# Patient Record
Sex: FEMALE | Race: WHITE | NOT HISPANIC OR LATINO | Employment: FULL TIME | URBAN - METROPOLITAN AREA
[De-identification: names, ages, dates, MRNs, and addresses within clinical notes are randomized per-mention and may not be internally consistent; named-entity substitution may affect disease eponyms.]

---

## 2017-01-19 ENCOUNTER — PRENATAL OFFICE VISIT (OUTPATIENT)
Dept: MIDWIFE SERVICES | Facility: CLINIC | Age: 32
End: 2017-01-19
Attending: ADVANCED PRACTICE MIDWIFE
Payer: COMMERCIAL

## 2017-01-19 ENCOUNTER — RADIANT APPOINTMENT (OUTPATIENT)
Dept: ULTRASOUND IMAGING | Facility: CLINIC | Age: 32
End: 2017-01-19
Attending: ADVANCED PRACTICE MIDWIFE
Payer: COMMERCIAL

## 2017-01-19 VITALS
WEIGHT: 149 LBS | TEMPERATURE: 97.9 F | BODY MASS INDEX: 24.79 KG/M2 | SYSTOLIC BLOOD PRESSURE: 117 MMHG | DIASTOLIC BLOOD PRESSURE: 70 MMHG | HEART RATE: 76 BPM

## 2017-01-19 DIAGNOSIS — Z34.82 ENCOUNTER FOR SUPERVISION OF OTHER NORMAL PREGNANCY IN SECOND TRIMESTER: ICD-10-CM

## 2017-01-19 DIAGNOSIS — Z23 NEED FOR TDAP VACCINATION: Primary | ICD-10-CM

## 2017-01-19 DIAGNOSIS — Z34.80 SUPERVISION OF OTHER NORMAL PREGNANCY, ANTEPARTUM: ICD-10-CM

## 2017-01-19 PROCEDURE — 76805 OB US >/= 14 WKS SNGL FETUS: CPT | Performed by: OBSTETRICS & GYNECOLOGY

## 2017-01-19 PROCEDURE — 99207 ZZC PRENATAL VISIT: CPT | Performed by: ADVANCED PRACTICE MIDWIFE

## 2017-01-19 NOTE — PROGRESS NOTES
US today for survey.  WNL preliminary with S=D.  Girl baby.  Have son at home.  No other concerns.  Will RTC in 5 wks for GCT.   JE

## 2017-01-23 ENCOUNTER — MYC MEDICAL ADVICE (OUTPATIENT)
Dept: MIDWIFE SERVICES | Facility: CLINIC | Age: 32
End: 2017-01-23

## 2017-02-24 ENCOUNTER — PRENATAL OFFICE VISIT (OUTPATIENT)
Dept: MIDWIFE SERVICES | Facility: CLINIC | Age: 32
End: 2017-02-24
Payer: COMMERCIAL

## 2017-02-24 VITALS
BODY MASS INDEX: 26.13 KG/M2 | WEIGHT: 157 LBS | DIASTOLIC BLOOD PRESSURE: 65 MMHG | HEART RATE: 75 BPM | SYSTOLIC BLOOD PRESSURE: 114 MMHG | OXYGEN SATURATION: 99 %

## 2017-02-24 DIAGNOSIS — Z34.82 OTHER NORMAL PREGNANCY, NOT FIRST, SECOND TRIMESTER: ICD-10-CM

## 2017-02-24 DIAGNOSIS — Z23 NEED FOR TDAP VACCINATION: Primary | ICD-10-CM

## 2017-02-24 LAB
GLUCOSE 1H P 50 G GLC PO SERPL-MCNC: 91 MG/DL (ref 60–129)
HGB BLD-MCNC: 12.2 G/DL (ref 11.7–15.7)

## 2017-02-24 PROCEDURE — 82950 GLUCOSE TEST: CPT | Performed by: ADVANCED PRACTICE MIDWIFE

## 2017-02-24 PROCEDURE — 00000218 ZZHCL STATISTIC OBHBG - HEMOGLOBIN: Performed by: ADVANCED PRACTICE MIDWIFE

## 2017-02-24 PROCEDURE — 99207 ZZC PRENATAL VISIT: CPT | Performed by: ADVANCED PRACTICE MIDWIFE

## 2017-02-24 PROCEDURE — 36415 COLL VENOUS BLD VENIPUNCTURE: CPT | Performed by: ADVANCED PRACTICE MIDWIFE

## 2017-02-24 NOTE — PROGRESS NOTES
24w1d  Feeling well. gct and hgb today. Having some discomforts sleeping and feels tired often. Reports good fetal movement. Denies leaking of fluid, vaginal bleeding, regular uterine contractions, headache or other concerns.  Discussed upcoming Tdap. RTC in 4 wks RONNIE

## 2017-02-24 NOTE — MR AVS SNAPSHOT
After Visit Summary   2/24/2017    Candy Rodriguez    MRN: [id number]           Patient Information     Date Of Birth          1985        Visit Information        Provider Department      2/24/2017 9:00 AM Alysha Medina APRN CNM [facility]        Today's Diagnoses     Need for Tdap vaccination    -  1    Other normal pregnancy, not first, second trimester           Follow-ups after your visit        Who to contact     If you have questions or need follow up information about today's clinic visit or your schedule please contact [facility] directly at [phone].  Normal or non-critical lab and imaging results will be communicated to you by Nomioshart, letter or phone within 4 business days after the clinic has received the results. If you do not hear from us within 7 days, please contact the clinic through iPinYout or phone. If you have a critical or abnormal lab result, we will notify you by phone as soon as possible.  Submit refill requests through Perfectore or call your pharmacy and they will forward the refill request to us. Please allow 3 business days for your refill to be completed.          Additional Information About Your Visit        MyChart Information     Perfectore gives you secure access to your electronic health record. If you see a primary care provider, you can also send messages to your care team and make appointments. If you have questions, please call your primary care clinic.  If you do not have a primary care provider, please call [phone] and they will assist you.        Care EveryWhere ID     This is your Care EveryWhere ID. This could be used by other organizations to access your [place] medical records  [id number]        Your Vitals Were     Pulse Last Period Pulse Oximetry Breastfeeding? BMI (Body Mass Index)       75 09/08/2016 (Exact Date) 99% No 26.13 kg/m2        Blood Pressure from Last 3 Encounters:   02/24/17 114/65    01/19/17 117/70   12/26/16 108/66    Weight from Last 3 Encounters:   02/24/17 157 lb (71.2 kg)   01/19/17 149 lb (67.6 kg)   12/26/16 147 lb 1.6 oz (66.7 kg)              We Performed the Following     Glucose tolerance gest screen 1 hour     OB hemoglobin        Primary Care Provider    Physician No Ref-Primary       No address on file        Thank you!     Thank you for choosing [facility]  for your care. Our goal is always to provide you with excellent care. Hearing back from our patients is one way we can continue to improve our services. Please take a few minutes to complete the written survey that you may receive in the mail after your visit with us. Thank you!             Your Updated Medication List - Protect others around you: Learn how to safely use, store and throw away your medicines at www.disposemymeds.org.          This list is accurate as of: 2/24/17  9:31 AM.  Always use your most recent med list.                   Brand Name Dispense Instructions for use    PRENATAL VITAMIN PO      Take 1 tablet by mouth daily

## 2017-02-25 ASSESSMENT — PATIENT HEALTH QUESTIONNAIRE - PHQ9: SUM OF ALL RESPONSES TO PHQ QUESTIONS 1-9: 0

## 2017-02-27 NOTE — PROGRESS NOTES
Dear Candy,    Your test results are attached below. Great news, you passed your glucose test and your hemoglobin is at a good level.  If you have any questions, please contact me via Preisbockt or you can call our office at [phone].    Alysha Jain CNM, ANA MARÍAM

## 2017-03-29 ENCOUNTER — PRENATAL OFFICE VISIT (OUTPATIENT)
Dept: MIDWIFE SERVICES | Facility: CLINIC | Age: 32
End: 2017-03-29
Payer: COMMERCIAL

## 2017-03-29 VITALS
OXYGEN SATURATION: 96 % | DIASTOLIC BLOOD PRESSURE: 66 MMHG | WEIGHT: 162 LBS | BODY MASS INDEX: 26.96 KG/M2 | TEMPERATURE: 97.2 F | SYSTOLIC BLOOD PRESSURE: 111 MMHG | HEART RATE: 88 BPM

## 2017-03-29 DIAGNOSIS — Z34.80 SUPERVISION OF OTHER NORMAL PREGNANCY, ANTEPARTUM: ICD-10-CM

## 2017-03-29 DIAGNOSIS — Z23 NEED FOR TDAP VACCINATION: Primary | ICD-10-CM

## 2017-03-29 PROCEDURE — 99207 ZZC PRENATAL VISIT: CPT | Performed by: ADVANCED PRACTICE MIDWIFE

## 2017-03-29 PROCEDURE — 90471 IMMUNIZATION ADMIN: CPT | Performed by: ADVANCED PRACTICE MIDWIFE

## 2017-03-29 PROCEDURE — 90715 TDAP VACCINE 7 YRS/> IM: CPT | Performed by: ADVANCED PRACTICE MIDWIFE

## 2017-03-29 NOTE — PROGRESS NOTES
Doing well.  No concerns.  May take Infant CPR refresher but not CBE.  Discussed difference with 1st vs 2nd pregnancy with BH ctx and faster labor on average.   ASSESSMENT: 28w6d  PLAN: RTC in 4 weeks.  Tdap done today.    JE

## 2017-03-29 NOTE — MR AVS SNAPSHOT
After Visit Summary   3/29/2017    Candy Rodriguez    MRN: [id number]           Patient Information     Date Of Birth          1985        Visit Information        Provider Department      3/29/2017 8:00 AM Wyatt Lindsay APRN CNM [facility]        Today's Diagnoses     Need for Tdap vaccination    -  1    Supervision of other normal pregnancy, antepartum           Follow-ups after your visit        Follow-up notes from your care team     Return in about 4 weeks (around 4/26/2017).      Your next 10 appointments already scheduled     Apr 26, 2017  8:00 AM JENNIFERT   ESTABLISHED PRENATAL with Dawna Doyle CNM   [facility] ([facility])    [address] [place] [address] [phone]              Who to contact     If you have questions or need follow up information about today's clinic visit or your schedule please contact [facility] directly at [phone].  Normal or non-critical lab and imaging results will be communicated to you by Africa Interactivehart, letter or phone within 4 business days after the clinic has received the results. If you do not hear from us within 7 days, please contact the clinic through [employer]t or phone. If you have a critical or abnormal lab result, we will notify you by phone as soon as possible.  Submit refill requests through Bravofly or call your pharmacy and they will forward the refill request to us. Please allow 3 business days for your refill to be completed.          Additional Information About Your Visit        MyChart Information     Bravofly gives you secure access to your electronic health record. If you see a primary care provider, you can also send messages to your care team and make appointments. If you have questions, please call your primary care clinic.  If you do not have a primary care provider, please call [phone] and they will assist you.        Care EveryWhere ID      This is your Care EveryWhere ID. This could be used by other organizations to access your [place] medical records  [id number]        Your Vitals Were     Pulse Temperature Last Period Pulse Oximetry BMI (Body Mass Index)       88 97.2  F (36.2  C) (Oral) 09/08/2016 (Exact Date) 96% 26.96 kg/m2        Blood Pressure from Last 3 Encounters:   03/29/17 111/66   02/24/17 114/65   01/19/17 117/70    Weight from Last 3 Encounters:   03/29/17 162 lb (73.5 kg)   02/24/17 157 lb (71.2 kg)   01/19/17 149 lb (67.6 kg)              We Performed the Following     ADMIN 1st VACCINE     TDAP VACCINE (ADACEL)        Primary Care Provider    Physician No Ref-Primary       No address on file        Thank you!     Thank you for choosing [facility]  for your care. Our goal is always to provide you with excellent care. Hearing back from our patients is one way we can continue to improve our services. Please take a few minutes to complete the written survey that you may receive in the mail after your visit with us. Thank you!             Your Updated Medication List - Protect others around you: Learn how to safely use, store and throw away your medicines at www.disposemymeds.org.          This list is accurate as of: 3/29/17  8:41 AM.  Always use your most recent med list.                   Brand Name Dispense Instructions for use    PRENATAL VITAMIN PO      Take 1 tablet by mouth daily

## 2017-05-01 ENCOUNTER — PRENATAL OFFICE VISIT (OUTPATIENT)
Dept: MIDWIFE SERVICES | Facility: CLINIC | Age: 32
End: 2017-05-01
Payer: COMMERCIAL

## 2017-05-01 VITALS
TEMPERATURE: 98.4 F | BODY MASS INDEX: 27.79 KG/M2 | WEIGHT: 167 LBS | SYSTOLIC BLOOD PRESSURE: 111 MMHG | HEART RATE: 71 BPM | DIASTOLIC BLOOD PRESSURE: 74 MMHG

## 2017-05-01 DIAGNOSIS — Z23 NEED FOR TDAP VACCINATION: ICD-10-CM

## 2017-05-01 DIAGNOSIS — Z34.80 SUPERVISION OF OTHER NORMAL PREGNANCY, ANTEPARTUM: Primary | ICD-10-CM

## 2017-05-01 PROCEDURE — 99207 ZZC PRENATAL VISIT: CPT | Performed by: ADVANCED PRACTICE MIDWIFE

## 2017-05-01 NOTE — PROGRESS NOTES
Feeling well, no concerns. May have viral pink eye from son. Baby is active. No regular contractions, LOF or bleeding. RTC in 2-3 weeks for GBS, hgb. MML

## 2017-05-01 NOTE — MR AVS SNAPSHOT
After Visit Summary   5/1/2017    Candy Rodriguez    MRN: [id number]           Patient Information     Date Of Birth          1985        Visit Information        Provider Department      5/1/2017 12:30 PM Shell Ricardo APRN CNM [facility]        Today's Diagnoses     Supervision of other normal pregnancy, antepartum    -  1    Need for Tdap vaccination           Follow-ups after your visit        Who to contact     If you have questions or need follow up information about today's clinic visit or your schedule please contact [facility] directly at [phone].  Normal or non-critical lab and imaging results will be communicated to you by Newlanshart, letter or phone within 4 business days after the clinic has received the results. If you do not hear from us within 7 days, please contact the clinic through Newlanshart or phone. If you have a critical or abnormal lab result, we will notify you by phone as soon as possible.  Submit refill requests through [employer] Of [employer] Entertainment or call your pharmacy and they will forward the refill request to us. Please allow 3 business days for your refill to be completed.          Additional Information About Your Visit        MyChart Information     [employer] Entertainment gives you secure access to your electronic health record. If you see a primary care provider, you can also send messages to your care team and make appointments. If you have questions, please call your primary care clinic.  If you do not have a primary care provider, please call [phone] and they will assist you.        Care EveryWhere ID     This is your Care EveryWhere ID. This could be used by other organizations to access your [place] medical records  [id number]        Your Vitals Were     Pulse Temperature Last Period BMI (Body Mass Index)          71 98.4  F (36.9  C) (Oral) 09/08/2016 (Exact Date) 27.79 kg/m2         Blood Pressure from Last 3 Encounters:   05/01/17 111/74    03/29/17 111/66   02/24/17 114/65    Weight from Last 3 Encounters:   05/01/17 167 lb (75.8 kg)   03/29/17 162 lb (73.5 kg)   02/24/17 157 lb (71.2 kg)              Today, you had the following     No orders found for display       Primary Care Provider Office Phone # Fax #    Shell Lopezhmann MAREK [facility] [phone]       [facility] [address] [facility] [address]  [facility] [address]        Thank you!     Thank you for choosing [facility]  for your care. Our goal is always to provide you with excellent care. Hearing back from our patients is one way we can continue to improve our services. Please take a few minutes to complete the written survey that you may receive in the mail after your visit with us. Thank you!             Your Updated Medication List - Protect others around you: Learn how to safely use, store and throw away your medicines at www.disposemymeds.org.          This list is accurate as of: 5/1/17 12:54 PM.  Always use your most recent med list.                   Brand Name Dispense Instructions for use    PRENATAL VITAMIN PO      Take 1 tablet by mouth daily

## 2017-05-22 ENCOUNTER — PRENATAL OFFICE VISIT (OUTPATIENT)
Dept: MIDWIFE SERVICES | Facility: CLINIC | Age: 32
End: 2017-05-22
Payer: COMMERCIAL

## 2017-05-22 VITALS
DIASTOLIC BLOOD PRESSURE: 77 MMHG | OXYGEN SATURATION: 100 % | WEIGHT: 168 LBS | BODY MASS INDEX: 27.96 KG/M2 | SYSTOLIC BLOOD PRESSURE: 120 MMHG | HEART RATE: 84 BPM

## 2017-05-22 DIAGNOSIS — Z34.83 ENCOUNTER FOR SUPERVISION OF OTHER NORMAL PREGNANCY IN THIRD TRIMESTER: Primary | ICD-10-CM

## 2017-05-22 DIAGNOSIS — Z23 NEED FOR TDAP VACCINATION: ICD-10-CM

## 2017-05-22 LAB — HGB BLD-MCNC: 13.7 G/DL (ref 11.7–15.7)

## 2017-05-22 PROCEDURE — 87653 STREP B DNA AMP PROBE: CPT | Performed by: ADVANCED PRACTICE MIDWIFE

## 2017-05-22 PROCEDURE — 36416 COLLJ CAPILLARY BLOOD SPEC: CPT | Performed by: ADVANCED PRACTICE MIDWIFE

## 2017-05-22 PROCEDURE — 00000218 ZZHCL STATISTIC OBHBG - HEMOGLOBIN: Performed by: ADVANCED PRACTICE MIDWIFE

## 2017-05-22 PROCEDURE — 99207 ZZC PRENATAL VISIT: CPT | Performed by: ADVANCED PRACTICE MIDWIFE

## 2017-05-22 NOTE — MR AVS SNAPSHOT
After Visit Summary   5/22/2017    Candy Rodriguez    MRN: [id number]           Patient Information     Date Of Birth          1985        Visit Information        Provider Department      5/22/2017 10:15 AM Beth Rodas APRN CNM [facility]        Today's Diagnoses     Encounter for supervision of other normal pregnancy in third trimester    -  1    Need for Tdap vaccination           Follow-ups after your visit        Your next 10 appointments already scheduled     May 30, 2017  8:15 AM CDT   ESTABLISHED PRENATAL with MAREK Pelayo CNM   [facility] ([facility])    [address] [place] [address] [phone]              Who to contact     If you have questions or need follow up information about today's clinic visit or your schedule please contact [facility] directly at [phone].  Normal or non-critical lab and imaging results will be communicated to you by MyChart, letter or phone within 4 business days after the clinic has received the results. If you do not hear from us within 7 days, please contact the clinic through Medgenicshart or phone. If you have a critical or abnormal lab result, we will notify you by phone as soon as possible.  Submit refill requests through Xenon Arc or call your pharmacy and they will forward the refill request to us. Please allow 3 business days for your refill to be completed.          Additional Information About Your Visit        MyChart Information     Xenon Arc gives you secure access to your electronic health record. If you see a primary care provider, you can also send messages to your care team and make appointments. If you have questions, please call your primary care clinic.  If you do not have a primary care provider, please call [phone] and they will assist you.        Care EveryWhere ID     This is your Care EveryWhere ID. This could be used by  other organizations to access your [place] medical records  [id number]        Your Vitals Were     Pulse Last Period Pulse Oximetry Breastfeeding? BMI (Body Mass Index)       84 09/08/2016 (Exact Date) 100% No 27.96 kg/m2        Blood Pressure from Last 3 Encounters:   05/22/17 120/77   05/01/17 111/74   03/29/17 111/66    Weight from Last 3 Encounters:   05/22/17 168 lb (76.2 kg)   05/01/17 167 lb (75.8 kg)   03/29/17 162 lb (73.5 kg)              We Performed the Following     Group B strep PCR     OB hemoglobin        Primary Care Provider Office Phone # Fax #    Shell MAREK Vick [facility] [phone]       [facility] [address] [facility] [address]  [facility] [address]        Thank you!     Thank you for choosing [facility]  for your care. Our goal is always to provide you with excellent care. Hearing back from our patients is one way we can continue to improve our services. Please take a few minutes to complete the written survey that you may receive in the mail after your visit with us. Thank you!             Your Updated Medication List - Protect others around you: Learn how to safely use, store and throw away your medicines at www.disposemymeds.org.          This list is accurate as of: 5/22/17 11:29 AM.  Always use your most recent med list.                   Brand Name Dispense Instructions for use    PRENATAL VITAMIN PO      Take 1 tablet by mouth daily

## 2017-05-22 NOTE — PROGRESS NOTES
36w4d   Patient feeling well. Positive fetal movement. Denies water leaking, vaginal bleeding, decreased fetal movement, contraction pain, or headaches.   Patient is doing well and no concerns today. She has lower abdominal pain and pressure but using some comfort measures to help. Measuring big still but measured big with her last baby and he was 7lb 15oz and had a normal growth during the pregnancy. Her sister, who is currently 34 weeks pregnant, also is measuring bigger. GBS and HGB today. Wanted cervix checked, 0/30/-4 intact.   Danger signs reviewed, pre-eclampsia signs and symptoms discussed.   Knows when to call triage and has phone numbers.   Follow up in 1 week.   Beth GOTTI

## 2017-05-23 ENCOUNTER — MYC MEDICAL ADVICE (OUTPATIENT)
Dept: MIDWIFE SERVICES | Facility: CLINIC | Age: 32
End: 2017-05-23

## 2017-05-23 LAB
GP B STREP DNA SPEC QL NAA+PROBE: ABNORMAL
SPECIMEN SOURCE: ABNORMAL

## 2017-05-23 PROCEDURE — 87186 SC STD MICRODIL/AGAR DIL: CPT | Performed by: ADVANCED PRACTICE MIDWIFE

## 2017-05-25 NOTE — TELEPHONE ENCOUNTER
From: Candy Rodriguez  To: Beth Rodas APRN CNM  Sent: 5/23/2017 4:22 PM CDT  Subject: Allergy testing    Elias Campos,     I know you mentioned that I could have allergy testing prior to delivery to determine if I am allergic to Penicillin. Is that something I should schedule now, before my next appointment? I'm seeing Wyatt on Tuesday morning. Is it usually difficult to get an appointment with the allergists?     Thanks,     Candy

## 2017-05-26 LAB
BACTERIA SPEC CULT: ABNORMAL
MICRO REPORT STATUS: ABNORMAL
MICROORGANISM SPEC CULT: ABNORMAL
SPECIMEN SOURCE: ABNORMAL

## 2017-05-27 PROBLEM — O99.820 GBS (GROUP B STREPTOCOCCUS CARRIER), +RV CULTURE, CURRENTLY PREGNANT: Status: ACTIVE | Noted: 2017-05-27

## 2017-05-30 ENCOUNTER — PRENATAL OFFICE VISIT (OUTPATIENT)
Dept: MIDWIFE SERVICES | Facility: CLINIC | Age: 32
End: 2017-05-30
Payer: COMMERCIAL

## 2017-05-30 VITALS
WEIGHT: 169 LBS | DIASTOLIC BLOOD PRESSURE: 74 MMHG | HEART RATE: 67 BPM | SYSTOLIC BLOOD PRESSURE: 124 MMHG | BODY MASS INDEX: 28.12 KG/M2 | TEMPERATURE: 97.3 F

## 2017-05-30 DIAGNOSIS — O99.820 GBS (GROUP B STREPTOCOCCUS CARRIER), +RV CULTURE, CURRENTLY PREGNANT: ICD-10-CM

## 2017-05-30 PROCEDURE — 99207 ZZC PRENATAL VISIT: CPT | Performed by: ADVANCED PRACTICE MIDWIFE

## 2017-05-30 NOTE — MR AVS SNAPSHOT
After Visit Summary   5/30/2017    Candy Rodriguez    MRN: [id number]           Patient Information     Date Of Birth          1985        Visit Information        Provider Department      5/30/2017 8:15 AM Wyatt Lindsay APRN CNM [facility]        Today's Diagnoses     GBS (group B Streptococcus carrier), +RV culture, currently pregnant           Follow-ups after your visit        Your next 10 appointments already scheduled     Jun 05, 2017  9:00 AM CDT   ESTABLISHED PRENATAL with MAREK Gonzales CNM   [facility] ([facility])    [address] [place] [address] [phone]              Who to contact     If you have questions or need follow up information about today's clinic visit or your schedule please contact [facility] directly at [phone].  Normal or non-critical lab and imaging results will be communicated to you by MyChart, letter or phone within 4 business days after the clinic has received the results. If you do not hear from us within 7 days, please contact the clinic through SpectraRephart or phone. If you have a critical or abnormal lab result, we will notify you by phone as soon as possible.  Submit refill requests through Telormedix or call your pharmacy and they will forward the refill request to us. Please allow 3 business days for your refill to be completed.          Additional Information About Your Visit        MyChart Information     Telormedix gives you secure access to your electronic health record. If you see a primary care provider, you can also send messages to your care team and make appointments. If you have questions, please call your primary care clinic.  If you do not have a primary care provider, please call [phone] and they will assist you.        Care EveryWhere ID     This is your Care EveryWhere ID. This could be used by other organizations to access your [place]  medical records  [id number]        Your Vitals Were     Pulse Temperature Last Period BMI (Body Mass Index)          67 97.3  F (36.3  C) (Oral) 09/08/2016 (Exact Date) 28.12 kg/m2         Blood Pressure from Last 3 Encounters:   05/30/17 124/74   05/22/17 120/77   05/01/17 111/74    Weight from Last 3 Encounters:   05/30/17 169 lb (76.7 kg)   05/22/17 168 lb (76.2 kg)   05/01/17 167 lb (75.8 kg)              Today, you had the following     No orders found for display       Primary Care Provider Office Phone # Fax #    Shell MAREK Vick [facility] [phone]       [facility] [address] [facility] [address]  [facility] [address]        Thank you!     Thank you for choosing [facility]  for your care. Our goal is always to provide you with excellent care. Hearing back from our patients is one way we can continue to improve our services. Please take a few minutes to complete the written survey that you may receive in the mail after your visit with us. Thank you!             Your Updated Medication List - Protect others around you: Learn how to safely use, store and throw away your medicines at www.disposemymeds.org.          This list is accurate as of: 5/30/17  2:09 PM.  Always use your most recent med list.                   Brand Name Dispense Instructions for use    PRENATAL VITAMIN PO      Take 1 tablet by mouth daily

## 2017-05-30 NOTE — PROGRESS NOTES
Candy is doing well.  Here sister is also due with her 1st a few weeks behind Candy so very fun.  No concerns.  Reviewed GBS positive result and hx as a baby that she had a rash per her mother.  No reaction to other antibiotics possibly even had Amoxicillin without issue.  Use Ancef in labor.  ASSESSMENT: 37w5d   PLAN: RTC weekly for PNV.    ERIC

## 2017-06-05 ENCOUNTER — PRENATAL OFFICE VISIT (OUTPATIENT)
Dept: MIDWIFE SERVICES | Facility: CLINIC | Age: 32
End: 2017-06-05
Payer: COMMERCIAL

## 2017-06-05 VITALS
BODY MASS INDEX: 28.46 KG/M2 | OXYGEN SATURATION: 99 % | DIASTOLIC BLOOD PRESSURE: 74 MMHG | HEART RATE: 81 BPM | SYSTOLIC BLOOD PRESSURE: 116 MMHG | WEIGHT: 171 LBS

## 2017-06-05 DIAGNOSIS — Z34.83 ENCOUNTER FOR SUPERVISION OF OTHER NORMAL PREGNANCY IN THIRD TRIMESTER: Primary | ICD-10-CM

## 2017-06-05 PROCEDURE — 99207 ZZC PRENATAL VISIT: CPT | Performed by: ADVANCED PRACTICE MIDWIFE

## 2017-06-05 NOTE — MR AVS SNAPSHOT
After Visit Summary   6/5/2017    Candy Rodriguez    MRN: [id number]           Patient Information     Date Of Birth          1985        Visit Information        Provider Department      6/5/2017 9:00 AM Beth Rodas APRN CNM [facility]        Today's Diagnoses     Encounter for supervision of other normal pregnancy in third trimester    -  1       Follow-ups after your visit        Your next 10 appointments already scheduled     Jun 12, 2017  9:45 AM CDT   ESTABLISHED PRENATAL with MAREK Garcia CNM   [facility] ([facility])    [address] [place] [address] [phone]              Who to contact     If you have questions or need follow up information about today's clinic visit or your schedule please contact [facility] directly at [phone].  Normal or non-critical lab and imaging results will be communicated to you by MyChart, letter or phone within 4 business days after the clinic has received the results. If you do not hear from us within 7 days, please contact the clinic through MyChart or phone. If you have a critical or abnormal lab result, we will notify you by phone as soon as possible.  Submit refill requests through Imitix or call your pharmacy and they will forward the refill request to us. Please allow 3 business days for your refill to be completed.          Additional Information About Your Visit        MyChart Information     Imitix gives you secure access to your electronic health record. If you see a primary care provider, you can also send messages to your care team and make appointments. If you have questions, please call your primary care clinic.  If you do not have a primary care provider, please call [phone] and they will assist you.        Care EveryWhere ID     This is your Care EveryWhere ID. This could be used by other organizations to  access your [place] medical records  [id number]        Your Vitals Were     Pulse Last Period Pulse Oximetry BMI (Body Mass Index)          81 09/08/2016 (Exact Date) 99% 28.46 kg/m2         Blood Pressure from Last 3 Encounters:   06/05/17 116/74   05/30/17 124/74   05/22/17 120/77    Weight from Last 3 Encounters:   06/05/17 171 lb (77.6 kg)   05/30/17 169 lb (76.7 kg)   05/22/17 168 lb (76.2 kg)              Today, you had the following     No orders found for display       Primary Care Provider Office Phone # Fax #    Shell MAREK Vick [facility] [phone]       [facility] [address] [facility] [address]  [facility] [address]        Thank you!     Thank you for choosing [facility]  for your care. Our goal is always to provide you with excellent care. Hearing back from our patients is one way we can continue to improve our services. Please take a few minutes to complete the written survey that you may receive in the mail after your visit with us. Thank you!             Your Updated Medication List - Protect others around you: Learn how to safely use, store and throw away your medicines at www.disposemymeds.org.          This list is accurate as of: 6/5/17  9:31 AM.  Always use your most recent med list.                   Brand Name Dispense Instructions for use    PRENATAL VITAMIN PO      Take 1 tablet by mouth daily

## 2017-06-05 NOTE — PROGRESS NOTES
38w4d  Patient feeling well. Positive fetal movement. Denies water leaking, vaginal bleeding, decreased fetal movement, contraction pain, or headaches.   Feeling well still. Ready for the baby and hoping not to go too far past her due date. No questions or concerns today. Discussed antibiotic choices for GBS, most likely use Ancef.   Danger signs reviewed, pre-eclampsia signs and symptoms discussed.   Knows when to call triage and has phone numbers.   Follow up in 1 week.   Beth GOTTI

## 2017-06-12 ENCOUNTER — PRENATAL OFFICE VISIT (OUTPATIENT)
Dept: MIDWIFE SERVICES | Facility: CLINIC | Age: 32
End: 2017-06-12
Payer: COMMERCIAL

## 2017-06-12 VITALS
BODY MASS INDEX: 28.29 KG/M2 | TEMPERATURE: 97.8 F | HEART RATE: 66 BPM | SYSTOLIC BLOOD PRESSURE: 126 MMHG | WEIGHT: 170 LBS | DIASTOLIC BLOOD PRESSURE: 69 MMHG

## 2017-06-12 DIAGNOSIS — Z34.83 OTHER NORMAL PREGNANCY, NOT FIRST, THIRD TRIMESTER: Primary | ICD-10-CM

## 2017-06-12 PROCEDURE — 99207 ZZC PRENATAL VISIT: CPT | Performed by: ADVANCED PRACTICE MIDWIFE

## 2017-06-12 NOTE — MR AVS SNAPSHOT
After Visit Summary   6/12/2017    Candy Rodriguez    MRN: [id number]           Patient Information     Date Of Birth          1985        Visit Information        Provider Department      6/12/2017 9:45 AM Alysha Medina APRN CNM [facility] [place] [place]        Today's Diagnoses     Other normal pregnancy, not first, third trimester    -  1       Follow-ups after your visit        Your next 10 appointments already scheduled     Jun 19, 2017  8:15 AM CDT   ESTABLISHED PRENATAL with MAREK Gonzales CNM   [facility] [place] [place] ([facility] [place] [place])    [address] [place] [id number] [phone]              Who to contact     If you have questions or need follow up information about today's clinic visit or your schedule please contact [facility] directly at [phone].  Normal or non-critical lab and imaging results will be communicated to you by MyChart, letter or phone within 4 business days after the clinic has received the results. If you do not hear from us within 7 days, please contact the clinic through TEVIZZhart or phone. If you have a critical or abnormal lab result, we will notify you by phone as soon as possible.  Submit refill requests through Yuanfen~Flowâ„¢ or call your pharmacy and they will forward the refill request to us. Please allow 3 business days for your refill to be completed.          Additional Information About Your Visit        MyChart Information     Yuanfen~Flowâ„¢ gives you secure access to your electronic health record. If you see a primary care provider, you can also send messages to your care team and make appointments. If you have questions, please call your primary care clinic.  If you do not have a primary care provider, please call [phone] and they will assist you.        Care EveryWhere ID     This is your Care EveryWhere ID. This could be used by other organizations to access your [facility]  medical records  [id number]        Your Vitals Were     Pulse Temperature Last Period BMI (Body Mass Index)          66 97.8  F (36.6  C) (Oral) 09/08/2016 (Exact Date) 28.29 kg/m2         Blood Pressure from Last 3 Encounters:   06/12/17 126/69   06/05/17 116/74   05/30/17 124/74    Weight from Last 3 Encounters:   06/12/17 170 lb (77.1 kg)   06/05/17 171 lb (77.6 kg)   05/30/17 169 lb (76.7 kg)              Today, you had the following     No orders found for display       Primary Care Provider Office Phone # Fax #    Shell MAREK Vick [facility] [phone]       [facility] [address] [facility] [address]  [facility] [address]        Thank you!     Thank you for choosing [facility]  for your care. Our goal is always to provide you with excellent care. Hearing back from our patients is one way we can continue to improve our services. Please take a few minutes to complete the written survey that you may receive in the mail after your visit with us. Thank you!             Your Updated Medication List - Protect others around you: Learn how to safely use, store and throw away your medicines at www.disposemymeds.org.          This list is accurate as of: 6/12/17  7:46 PM.  Always use your most recent med list.                   Brand Name Dispense Instructions for use    PRENATAL VITAMIN PO      Take 1 tablet by mouth daily

## 2017-06-13 NOTE — PROGRESS NOTES
39w4d  Feeling well. Ready for baby. Reports good fetal movement. Denies leaking of fluid, vaginal bleeding, regular uterine contractions, headache or other concerns.  RTC in 1 wk KETURAH

## 2017-06-19 ENCOUNTER — ANESTHESIA EVENT (OUTPATIENT)
Dept: OBGYN | Facility: CLINIC | Age: 32
End: 2017-06-19
Payer: COMMERCIAL

## 2017-06-19 ENCOUNTER — PRENATAL OFFICE VISIT (OUTPATIENT)
Dept: MIDWIFE SERVICES | Facility: CLINIC | Age: 32
End: 2017-06-19
Payer: COMMERCIAL

## 2017-06-19 ENCOUNTER — HOSPITAL ENCOUNTER (INPATIENT)
Facility: CLINIC | Age: 32
LOS: 2 days | Discharge: HOME HEALTH | End: 2017-06-21
Attending: ADVANCED PRACTICE MIDWIFE | Admitting: ADVANCED PRACTICE MIDWIFE
Payer: COMMERCIAL

## 2017-06-19 ENCOUNTER — NURSE TRIAGE (OUTPATIENT)
Dept: NURSING | Facility: CLINIC | Age: 32
End: 2017-06-19

## 2017-06-19 ENCOUNTER — ANESTHESIA (OUTPATIENT)
Dept: OBGYN | Facility: CLINIC | Age: 32
End: 2017-06-19
Payer: COMMERCIAL

## 2017-06-19 VITALS
TEMPERATURE: 97.2 F | HEART RATE: 74 BPM | SYSTOLIC BLOOD PRESSURE: 125 MMHG | BODY MASS INDEX: 28.29 KG/M2 | DIASTOLIC BLOOD PRESSURE: 76 MMHG | WEIGHT: 170 LBS

## 2017-06-19 DIAGNOSIS — Z34.83 ENCOUNTER FOR SUPERVISION OF OTHER NORMAL PREGNANCY IN THIRD TRIMESTER: Primary | ICD-10-CM

## 2017-06-19 LAB
ABO + RH BLD: NORMAL
ABO + RH BLD: NORMAL
BLD GP AB SCN SERPL QL: NORMAL
BLOOD BANK CMNT PATIENT-IMP: NORMAL
ERYTHROCYTE [DISTWIDTH] IN BLOOD BY AUTOMATED COUNT: 13.6 % (ref 10–15)
HCT VFR BLD AUTO: 42.6 % (ref 35–47)
HGB BLD-MCNC: 14.9 G/DL (ref 11.7–15.7)
MCH RBC QN AUTO: 31.4 PG (ref 26.5–33)
MCHC RBC AUTO-ENTMCNC: 35 G/DL (ref 31.5–36.5)
MCV RBC AUTO: 90 FL (ref 78–100)
PLATELET # BLD AUTO: 154 10E9/L (ref 150–450)
RBC # BLD AUTO: 4.74 10E12/L (ref 3.8–5.2)
SPECIMEN EXP DATE BLD: NORMAL
WBC # BLD AUTO: 18.5 10E9/L (ref 4–11)

## 2017-06-19 PROCEDURE — 99215 OFFICE O/P EST HI 40 MIN: CPT

## 2017-06-19 PROCEDURE — 86850 RBC ANTIBODY SCREEN: CPT | Performed by: ADVANCED PRACTICE MIDWIFE

## 2017-06-19 PROCEDURE — 86780 TREPONEMA PALLIDUM: CPT | Performed by: ADVANCED PRACTICE MIDWIFE

## 2017-06-19 PROCEDURE — 25000125 ZZHC RX 250: Performed by: ANESTHESIOLOGY

## 2017-06-19 PROCEDURE — S0020 INJECTION, BUPIVICAINE HYDRO: HCPCS | Performed by: ANESTHESIOLOGY

## 2017-06-19 PROCEDURE — 85027 COMPLETE CBC AUTOMATED: CPT | Performed by: ADVANCED PRACTICE MIDWIFE

## 2017-06-19 PROCEDURE — 86900 BLOOD TYPING SEROLOGIC ABO: CPT | Performed by: ADVANCED PRACTICE MIDWIFE

## 2017-06-19 PROCEDURE — 99207 ZZC PRENATAL VISIT: CPT | Performed by: ADVANCED PRACTICE MIDWIFE

## 2017-06-19 PROCEDURE — 25000128 H RX IP 250 OP 636: Performed by: ADVANCED PRACTICE MIDWIFE

## 2017-06-19 PROCEDURE — 00HU33Z INSERTION OF INFUSION DEVICE INTO SPINAL CANAL, PERCUTANEOUS APPROACH: ICD-10-PCS | Performed by: ANESTHESIOLOGY

## 2017-06-19 PROCEDURE — 25000128 H RX IP 250 OP 636

## 2017-06-19 PROCEDURE — 3E0S3CZ INTRODUCTION OF REGIONAL ANESTHETIC INTO EPIDURAL SPACE, PERCUTANEOUS APPROACH: ICD-10-PCS | Performed by: ANESTHESIOLOGY

## 2017-06-19 PROCEDURE — 12000030 ZZH R&B OB INTERMEDIATE UMMC

## 2017-06-19 PROCEDURE — 86901 BLOOD TYPING SEROLOGIC RH(D): CPT | Performed by: ADVANCED PRACTICE MIDWIFE

## 2017-06-19 PROCEDURE — 0KQM0ZZ REPAIR PERINEUM MUSCLE, OPEN APPROACH: ICD-10-PCS | Performed by: ADVANCED PRACTICE MIDWIFE

## 2017-06-19 RX ORDER — LIDOCAINE HYDROCHLORIDE AND EPINEPHRINE 15; 5 MG/ML; UG/ML
INJECTION, SOLUTION EPIDURAL PRN
Status: DISCONTINUED | OUTPATIENT
Start: 2017-06-19 | End: 2018-12-11 | Stop reason: HOSPADM

## 2017-06-19 RX ORDER — EPHEDRINE SULFATE 50 MG/ML
5 INJECTION, SOLUTION INTRAMUSCULAR; INTRAVENOUS; SUBCUTANEOUS
Status: DISCONTINUED | OUTPATIENT
Start: 2017-06-19 | End: 2017-06-21 | Stop reason: HOSPADM

## 2017-06-19 RX ORDER — EPHEDRINE SULFATE 50 MG/ML
INJECTION, SOLUTION INTRAMUSCULAR; INTRAVENOUS; SUBCUTANEOUS
Status: DISCONTINUED
Start: 2017-06-19 | End: 2017-06-20 | Stop reason: WASHOUT

## 2017-06-19 RX ORDER — OXYCODONE AND ACETAMINOPHEN 5; 325 MG/1; MG/1
1 TABLET ORAL
Status: DISCONTINUED | OUTPATIENT
Start: 2017-06-19 | End: 2017-06-20

## 2017-06-19 RX ORDER — OXYTOCIN 10 [USP'U]/ML
INJECTION, SOLUTION INTRAMUSCULAR; INTRAVENOUS
Status: DISCONTINUED
Start: 2017-06-19 | End: 2017-06-20 | Stop reason: WASHOUT

## 2017-06-19 RX ORDER — ACETAMINOPHEN 325 MG/1
650 TABLET ORAL EVERY 4 HOURS PRN
Status: DISCONTINUED | OUTPATIENT
Start: 2017-06-19 | End: 2017-06-20

## 2017-06-19 RX ORDER — CEFAZOLIN SODIUM 1 G/3ML
1 INJECTION, POWDER, FOR SOLUTION INTRAMUSCULAR; INTRAVENOUS EVERY 8 HOURS
Status: DISCONTINUED | OUTPATIENT
Start: 2017-06-20 | End: 2017-06-20

## 2017-06-19 RX ORDER — NALBUPHINE HYDROCHLORIDE 10 MG/ML
2.5-5 INJECTION, SOLUTION INTRAMUSCULAR; INTRAVENOUS; SUBCUTANEOUS EVERY 6 HOURS PRN
Status: DISCONTINUED | OUTPATIENT
Start: 2017-06-19 | End: 2017-06-21 | Stop reason: HOSPADM

## 2017-06-19 RX ORDER — CARBOPROST TROMETHAMINE 250 UG/ML
250 INJECTION, SOLUTION INTRAMUSCULAR
Status: DISCONTINUED | OUTPATIENT
Start: 2017-06-19 | End: 2017-06-20

## 2017-06-19 RX ORDER — METHYLERGONOVINE MALEATE 0.2 MG/ML
200 INJECTION INTRAVENOUS
Status: DISCONTINUED | OUTPATIENT
Start: 2017-06-19 | End: 2017-06-20

## 2017-06-19 RX ORDER — NALOXONE HYDROCHLORIDE 0.4 MG/ML
.1-.4 INJECTION, SOLUTION INTRAMUSCULAR; INTRAVENOUS; SUBCUTANEOUS
Status: DISCONTINUED | OUTPATIENT
Start: 2017-06-19 | End: 2017-06-20

## 2017-06-19 RX ORDER — SODIUM CHLORIDE, SODIUM LACTATE, POTASSIUM CHLORIDE, CALCIUM CHLORIDE 600; 310; 30; 20 MG/100ML; MG/100ML; MG/100ML; MG/100ML
INJECTION, SOLUTION INTRAVENOUS CONTINUOUS
Status: DISCONTINUED | OUTPATIENT
Start: 2017-06-19 | End: 2017-06-20

## 2017-06-19 RX ORDER — CEFAZOLIN SODIUM 2 G/100ML
2 INJECTION, SOLUTION INTRAVENOUS ONCE
Status: COMPLETED | OUTPATIENT
Start: 2017-06-19 | End: 2017-06-19

## 2017-06-19 RX ORDER — OXYTOCIN 10 [USP'U]/ML
10 INJECTION, SOLUTION INTRAMUSCULAR; INTRAVENOUS
Status: DISCONTINUED | OUTPATIENT
Start: 2017-06-19 | End: 2017-06-20

## 2017-06-19 RX ORDER — FENTANYL CITRATE 50 UG/ML
50-100 INJECTION, SOLUTION INTRAMUSCULAR; INTRAVENOUS
Status: DISCONTINUED | OUTPATIENT
Start: 2017-06-19 | End: 2017-06-20

## 2017-06-19 RX ORDER — OXYTOCIN/0.9 % SODIUM CHLORIDE 30/500 ML
100-340 PLASTIC BAG, INJECTION (ML) INTRAVENOUS CONTINUOUS PRN
Status: COMPLETED | OUTPATIENT
Start: 2017-06-19 | End: 2017-06-20

## 2017-06-19 RX ORDER — LIDOCAINE HYDROCHLORIDE 10 MG/ML
INJECTION, SOLUTION EPIDURAL; INFILTRATION; INTRACAUDAL; PERINEURAL
Status: COMPLETED
Start: 2017-06-19 | End: 2017-06-20

## 2017-06-19 RX ORDER — ONDANSETRON 2 MG/ML
4 INJECTION INTRAMUSCULAR; INTRAVENOUS EVERY 6 HOURS PRN
Status: DISCONTINUED | OUTPATIENT
Start: 2017-06-19 | End: 2017-06-20

## 2017-06-19 RX ORDER — SODIUM CHLORIDE, SODIUM LACTATE, POTASSIUM CHLORIDE, CALCIUM CHLORIDE 600; 310; 30; 20 MG/100ML; MG/100ML; MG/100ML; MG/100ML
INJECTION, SOLUTION INTRAVENOUS
Status: COMPLETED
Start: 2017-06-19 | End: 2017-06-19

## 2017-06-19 RX ORDER — OXYTOCIN/0.9 % SODIUM CHLORIDE 30/500 ML
PLASTIC BAG, INJECTION (ML) INTRAVENOUS
Status: COMPLETED
Start: 2017-06-19 | End: 2017-06-20

## 2017-06-19 RX ORDER — IBUPROFEN 800 MG/1
800 TABLET, FILM COATED ORAL
Status: COMPLETED | OUTPATIENT
Start: 2017-06-19 | End: 2017-06-20

## 2017-06-19 RX ADMIN — Medication 8 ML: at 21:20

## 2017-06-19 RX ADMIN — SODIUM CHLORIDE, POTASSIUM CHLORIDE, SODIUM LACTATE AND CALCIUM CHLORIDE 1000 ML: 600; 310; 30; 20 INJECTION, SOLUTION INTRAVENOUS at 20:55

## 2017-06-19 RX ADMIN — LIDOCAINE HYDROCHLORIDE,EPINEPHRINE BITARTRATE 3 ML: 15; .005 INJECTION, SOLUTION EPIDURAL; INFILTRATION; INTRACAUDAL; PERINEURAL at 21:15

## 2017-06-19 RX ADMIN — SODIUM CHLORIDE, POTASSIUM CHLORIDE, SODIUM LACTATE AND CALCIUM CHLORIDE: 600; 310; 30; 20 INJECTION, SOLUTION INTRAVENOUS at 22:18

## 2017-06-19 RX ADMIN — CEFAZOLIN SODIUM 2 G: 2 INJECTION, SOLUTION INTRAVENOUS at 21:08

## 2017-06-19 NOTE — IP AVS SNAPSHOT
MRN:[id number]                      After Visit Summary   6/19/2017    Candy Rodriguez    MRN: [id number]           Thank you!     Thank you for choosing [facility] for your care. Our goal is always to provide you with excellent care. Hearing back from our patients is one way we can continue to improve our services. Please take a few minutes to complete the written survey that you may receive in the mail after you visit with us. Thank you!        Patient Information     Date Of Birth          1985        Designated Caregiver       Most Recent Value    Caregiver    Will someone help with your care after discharge? yes    Name of designated caregiver Zacarias    Phone number of caregiver [phone]    Caregiver address same address as pt      About your hospital stay     You were admitted on:  June 19, 2017 You last received care in the:  [facility]    You were discharged on:  June 21, 2017       Who to Call     For medical emergencies, please call 911.  For non-urgent questions about your medical care, please call your primary care provider or clinic, [phone]          Attending Provider     Provider Specialty    Wyatt Lindsay APRN CNM [facility]       Primary Care Provider Office Phone # Fax #    MAREK Barros [phone]      Further instructions from your care team       Postpartum Vaginal Delivery Instructions    Activity       Ask family and friends for help when you need it.    Do not place anything in your vagina for 6 weeks.    You are not restricted on other activities, but take it easy for a few weeks to allow your body to recover from delivery.  You are able to do any activities you feel up to that point.    No driving until you have stopped taking your pain medications (usually two weeks after delivery).     Call your health care provider if you have any of these symptoms:       Increased pain, swelling, redness, or fluid around your stiches from an episiotomy or  "perineal tear.    A fever above 100.4 F (38 C) with or without chills when placing a thermometer under your tongue.    You soak a sanitary pad with blood within 1 hour, or you see blood clots larger than a golf ball.    Bleeding that lasts more than 6 weeks.    Vaginal discharge that smells bad.    Severe pain, cramping or tenderness in your lower belly area.    A need to urinate more frequently (use the toilet more often), more urgently (use the toilet very quickly), or it burns when you urinate.    Nausea and vomiting.    Redness, swelling or pain around a vein in your leg.    Problems breastfeeding or a red or painful area on your breast.    Chest pain and cough or are gasping for air.    Problems coping with sadness, anxiety, or depression.  If you have any concerns about hurting yourself or the baby, call your provider immediately.     You have questions or concerns after you return home.     Keep your hands clean:  Always wash your hands before touching your perineal area and stitches.  This helps reduce your risk of infection.  If your hands aren't dirty, you may use an alcohol hand-rub to clean your hands. Keep your nails clean and short.        Pending Results     No orders found from 6/17/2017 to 6/20/2017.            Admission Information     Date & Time Provider Department Dept. Phone    6/19/2017 Wyatt Lindsay APRN CNM [facility] [phone]      Your Vitals Were     Blood Pressure Pulse Temperature Respirations Height Weight    121/75 66 98.4  F (36.9  C) (Oral) 18 1.651 m (5' 5\") 77.1 kg (170 lb)    Last Period Pulse Oximetry BMI (Body Mass Index)             09/08/2016 (Exact Date) 100% 28.29 kg/m2         MyChart Information     PharmAbcine gives you secure access to your electronic health record. If you see a primary care provider, you can also send messages to your care team and make appointments. If you have questions, please call your primary care clinic.  If you do not have a primary care provider, " please call [phone] and they will assist you.        Care EveryWhere ID     This is your Care EveryWhere ID. This could be used by other organizations to access your [facility] medical records  [id number]        Equal Access to Services     HUY PINEDA: Jamel Addison, waaxda luqadaha, qaybta kaalmada adetangelada, lindsey aby hayjosias saenz keltongrace pineda. So [facility] [phone].    ATENCIÓN: Si habla español, tiene a mckee disposición servicios gratuitos de asistencia lingüística. Llame al [phone].    We comply with applicable federal civil rights laws and [place] laws. We do not discriminate on the basis of race, color, national origin, age, disability sex, sexual orientation or gender identity.               Review of your medicines      UNREVIEWED medicines. Ask your doctor about these medicines        Dose / Directions    PRENATAL VITAMIN PO        Dose:  1 tablet   Take 1 tablet by mouth daily   Refills:  0                Protect others around you: Learn how to safely use, store and throw away your medicines at www.disposemymeds.org.             Medication List: This is a list of all your medications and when to take them. Check marks below indicate your daily home schedule. Keep this list as a reference.      Medications           Morning Afternoon Evening Bedtime As Needed    PRENATAL VITAMIN PO   Take 1 tablet by mouth daily

## 2017-06-19 NOTE — IP AVS SNAPSHOT
[facility]    [address] [facility] [address]    Phone:  [phone]                                       After Visit Summary   6/19/2017    Candy Rodriguez    MRN: [id number]           After Visit Summary Signature Page     I have received my discharge instructions, and my questions have been answered. I have discussed any challenges I see with this plan with the nurse or doctor.    ..........................................................................................................................................  Patient/Patient Representative Signature      ..........................................................................................................................................  Patient Representative Print Name and Relationship to Patient    ..................................................               ................................................  Date                                            Time    ..........................................................................................................................................  Reviewed by Signature/Title    ...................................................              ..............................................  Date                                                            Time

## 2017-06-19 NOTE — MR AVS SNAPSHOT
After Visit Summary   6/19/2017    Candy Rodriguez    MRN: [id number]           Patient Information     Date Of Birth          1985        Visit Information        Provider Department      6/19/2017 8:15 AM Beth Rodas APRN CNM [facility]        Today's Diagnoses     Encounter for supervision of other normal pregnancy in third trimester    -  1       Follow-ups after your visit        Future tests that were ordered for you today     Open Future Orders        Priority Expected Expires Ordered    US OB Ltd One Or More Fetus FU/Repeat Routine  6/19/2018 6/19/2017            Who to contact     If you have questions or need follow up information about today's clinic visit or your schedule please contact [facility] directly at [phone].  Normal or non-critical lab and imaging results will be communicated to you by SkuServehart, letter or phone within 4 business days after the clinic has received the results. If you do not hear from us within 7 days, please contact the clinic through SkuServehart or phone. If you have a critical or abnormal lab result, we will notify you by phone as soon as possible.  Submit refill requests through Mitralign or call your pharmacy and they will forward the refill request to us. Please allow 3 business days for your refill to be completed.          Additional Information About Your Visit        MyChart Information     Mitralign gives you secure access to your electronic health record. If you see a primary care provider, you can also send messages to your care team and make appointments. If you have questions, please call your primary care clinic.  If you do not have a primary care provider, please call [phone] and they will assist you.        Care EveryWhere ID     This is your Care EveryWhere ID. This could be used by other organizations to access your [facility] medical records  [id number]        Your Vitals Were     Pulse Temperature  Last Period BMI (Body Mass Index)          74 97.2  F (36.2  C) (Oral) 09/08/2016 (Exact Date) 28.29 kg/m2         Blood Pressure from Last 3 Encounters:   06/19/17 125/76   06/12/17 126/69   06/05/17 116/74    Weight from Last 3 Encounters:   06/19/17 170 lb (77.1 kg)   06/12/17 170 lb (77.1 kg)   06/05/17 171 lb (77.6 kg)               Primary Care Provider Office Phone # Fax #    Shell Bethea MAREK Ricardo [facility] [phone]       [facility] [address] [facility] [address]  [facility] [address]        Thank you!     Thank you for choosing [facility]  [place] your [place]. Our goal is always to provide you with excellent care. Hearing back from our patients is one way we can continue to improve our services. Please take a few minutes to complete the written survey that you may receive in the mail after your visit with us. Thank you!             Your Updated Medication List - Protect others around you: Learn how to safely use, store and throw away your medicines at www.disposemymeds.org.          This list is accurate as of: 6/19/17  8:53 AM.  Always use your most recent med list.                   Brand Name Dispense Instructions for use    PRENATAL VITAMIN PO      Take 1 tablet by mouth daily

## 2017-06-19 NOTE — PROGRESS NOTES
40w4d   Patient feeling well. Positive fetal movement. Denies water leaking, vaginal bleeding, decreased fetal movement, contraction pain, or headaches.   Patient is doing well. She is ready for labor and hoping soon. She delivered her son early so was not really expecting to go late this time. Would like to have her cervix checked today, 2/50/-2 intact. Discussed postdates testing. PALMA ordered for Thursday.   Danger signs reviewed, pre-eclampsia signs and symptoms discussed.   Knows when to call triage and has phone numbers.   Follow up in 3 days for NST and PALMA for postdates testing.   Beth GOTTI

## 2017-06-20 LAB — T PALLIDUM IGG+IGM SER QL: NEGATIVE

## 2017-06-20 PROCEDURE — 72200001 ZZH LABOR CARE VAGINAL DELIVERY SINGLE

## 2017-06-20 PROCEDURE — 12000030 ZZH R&B OB INTERMEDIATE UMMC

## 2017-06-20 PROCEDURE — 25000132 ZZH RX MED GY IP 250 OP 250 PS 637: Performed by: ADVANCED PRACTICE MIDWIFE

## 2017-06-20 PROCEDURE — 25000125 ZZHC RX 250

## 2017-06-20 PROCEDURE — 59400 OBSTETRICAL CARE: CPT | Performed by: ADVANCED PRACTICE MIDWIFE

## 2017-06-20 RX ORDER — HYDROCORTISONE 2.5 %
CREAM (GRAM) TOPICAL 3 TIMES DAILY PRN
Status: DISCONTINUED | OUTPATIENT
Start: 2017-06-20 | End: 2017-06-21 | Stop reason: HOSPADM

## 2017-06-20 RX ORDER — IBUPROFEN 400 MG/1
400-800 TABLET, FILM COATED ORAL EVERY 6 HOURS PRN
Status: DISCONTINUED | OUTPATIENT
Start: 2017-06-20 | End: 2017-06-21 | Stop reason: HOSPADM

## 2017-06-20 RX ORDER — BISACODYL 10 MG
10 SUPPOSITORY, RECTAL RECTAL DAILY PRN
Status: DISCONTINUED | OUTPATIENT
Start: 2017-06-22 | End: 2017-06-21 | Stop reason: HOSPADM

## 2017-06-20 RX ORDER — OXYTOCIN/0.9 % SODIUM CHLORIDE 30/500 ML
100 PLASTIC BAG, INJECTION (ML) INTRAVENOUS CONTINUOUS
Status: DISCONTINUED | OUTPATIENT
Start: 2017-06-20 | End: 2017-06-21 | Stop reason: HOSPADM

## 2017-06-20 RX ORDER — NALOXONE HYDROCHLORIDE 0.4 MG/ML
.1-.4 INJECTION, SOLUTION INTRAMUSCULAR; INTRAVENOUS; SUBCUTANEOUS
Status: DISCONTINUED | OUTPATIENT
Start: 2017-06-20 | End: 2017-06-21 | Stop reason: HOSPADM

## 2017-06-20 RX ORDER — MISOPROSTOL 200 UG/1
400 TABLET ORAL
Status: DISCONTINUED | OUTPATIENT
Start: 2017-06-20 | End: 2017-06-21 | Stop reason: HOSPADM

## 2017-06-20 RX ORDER — OXYTOCIN 10 [USP'U]/ML
10 INJECTION, SOLUTION INTRAMUSCULAR; INTRAVENOUS
Status: DISCONTINUED | OUTPATIENT
Start: 2017-06-20 | End: 2017-06-21 | Stop reason: HOSPADM

## 2017-06-20 RX ORDER — AMOXICILLIN 250 MG
1-2 CAPSULE ORAL 2 TIMES DAILY
Status: DISCONTINUED | OUTPATIENT
Start: 2017-06-20 | End: 2017-06-21 | Stop reason: HOSPADM

## 2017-06-20 RX ORDER — OXYTOCIN/0.9 % SODIUM CHLORIDE 30/500 ML
340 PLASTIC BAG, INJECTION (ML) INTRAVENOUS CONTINUOUS PRN
Status: DISCONTINUED | OUTPATIENT
Start: 2017-06-20 | End: 2017-06-21 | Stop reason: HOSPADM

## 2017-06-20 RX ORDER — ACETAMINOPHEN 325 MG/1
650 TABLET ORAL EVERY 4 HOURS PRN
Status: DISCONTINUED | OUTPATIENT
Start: 2017-06-20 | End: 2017-06-21 | Stop reason: HOSPADM

## 2017-06-20 RX ORDER — OXYCODONE HYDROCHLORIDE 5 MG/1
5-10 TABLET ORAL
Status: DISCONTINUED | OUTPATIENT
Start: 2017-06-20 | End: 2017-06-21 | Stop reason: HOSPADM

## 2017-06-20 RX ORDER — LANOLIN 100 %
OINTMENT (GRAM) TOPICAL
Status: DISCONTINUED | OUTPATIENT
Start: 2017-06-20 | End: 2017-06-21 | Stop reason: HOSPADM

## 2017-06-20 RX ADMIN — OXYTOCIN-SODIUM CHLORIDE 0.9% IV SOLN 30 UNIT/500ML 340 ML/HR: 30-0.9/5 SOLUTION at 01:57

## 2017-06-20 RX ADMIN — IBUPROFEN 800 MG: 400 TABLET ORAL at 22:32

## 2017-06-20 RX ADMIN — SENNOSIDES AND DOCUSATE SODIUM 1 TABLET: 8.6; 5 TABLET ORAL at 19:01

## 2017-06-20 RX ADMIN — Medication 340 ML/HR: at 01:57

## 2017-06-20 RX ADMIN — IBUPROFEN 800 MG: 800 TABLET ORAL at 02:46

## 2017-06-20 RX ADMIN — LIDOCAINE HYDROCHLORIDE 10 ML: 10 INJECTION, SOLUTION EPIDURAL; INFILTRATION; INTRACAUDAL; PERINEURAL at 02:05

## 2017-06-20 RX ADMIN — IBUPROFEN 800 MG: 400 TABLET ORAL at 15:58

## 2017-06-20 RX ADMIN — IBUPROFEN 800 MG: 400 TABLET ORAL at 08:38

## 2017-06-20 RX ADMIN — SENNOSIDES AND DOCUSATE SODIUM 1 TABLET: 8.6; 5 TABLET ORAL at 08:38

## 2017-06-20 NOTE — PROGRESS NOTES
"Post Partum Note    Candy Rodriguez      MRN#: [id number]  Age: 31 year old      YOB: 1985      Post-partum day #0    SIGNIFICANT PROBLEMS:  Patient Active Problem List    Diagnosis Date Noted      (normal spontaneous vaginal delivery) 2017     Priority: Medium     Female at 0150 on  with 2nd degree laceration repair.  .  Apgar 9/9.  GBS adequately treated in labor.         Labor and delivery indication for care or intervention 2017     Priority: Medium     GBS (group B Streptococcus carrier), +RV culture, currently pregnant 2017     Priority: Medium     Allergic to PCN with rash as baby,  Has taken antibiotics without issues, maybe even Amoxicillin.  Low risk.  Appropriate for use of Ancef in labor.         Supervision of other normal pregnancy, antepartum 2016     Priority: Medium     FOB:  Zacarias    Sisters with another patient in the group, about 2 weeks behind her.        Need for Tdap vaccination 2016     Priority: Medium     TDAP GIVEN  Genia Toledo MA 2017           Multiple pigmented nevi 2015     Priority: Medium     Facial rhytids 2015     Priority: Medium     Irritant dermatitis 2015     Priority: Medium     CARDIOVASCULAR SCREENING; LDL GOAL LESS THAN 160 2010     Priority: Medium       INTERVAL HISTORY:  /67  Pulse 65  Temp 98.6  F (37  C) (Oral)  Resp 18  Ht 1.651 m (5' 5\")  Wt 77.1 kg (170 lb)  LMP 2016 (Exact Date)  SpO2 98%  Breastfeeding? Unknown  BMI 28.29 kg/m2    Pt stable, baby is rooming in  Breast feeding status:initiated  Complications since 2 hours post delivery: None  Patient is tolerating acitivity well Voiding without difficulty, cramping is minimal and is relieved by Ibuprophen, lochia is decreasing and patient denies clots.  Perineal pain is is minimal and is relieved by Ibuprophen.  The perineum laceration is well approximated    Normal postpartum exam     Postpartum " hemoglobin   Hemoglobin   Date Value Ref Range Status   06/19/2017 14.9 11.7 - 15.7 g/dL Final     Blood type   Lab Results   Component Value Date    ABO O 06/19/2017       Lab Results   Component Value Date    RH  Pos 06/19/2017     Rubella immune    ASSESSMENT/PLAN:  Stable Post-partum day #0  Complications:none  Plan d/c home tomorrow  RTC 6 weeks, 8 weeks if she decides to do IUD  Teaching done: D/C Instructions: Nutrition/Activity, Birth Control Options, Warning Signs/When to Call: Excessive Bleeding, Infection, PP Depression, RTC Clinic for PP Appointment and PNV    Postpartum warning s/s reviewed, including bleeding/clots, fever, mastitis, or depression    Birthcontrol planned:IUD - she used this in the past and is thinking that she will do it again but is not certain  Current Discharge Medication List      CONTINUE these medications which have NOT CHANGED    Details   Prenatal Vit-Fe Fumarate-FA (PRENATAL VITAMIN PO) Take 1 tablet by mouth daily         Shell Ricardo CNM

## 2017-06-20 NOTE — PLAN OF CARE
Problem: Goal Outcome Summary  Goal: Goal Outcome Summary  Outcome: Improving  VSS and FF at U/1 with small lochia. Pt is up voiding. Denies pain, but taking Ibuprofen for pain control. Pt is using tucks for carli discomfort.  Breastfeeding with minimal assist latching/ positioning. Nipples are tender and pt is using her colostrum. Assisted pt with each feeding and education done on latching/ postioning techniques. Pt was given hydrogel with instructions for comfort.  Encouraged pt to soak in the tub twice a day and continue using tucks for comfort. Continue cares and assist with breastfeeding as needed.

## 2017-06-20 NOTE — PROVIDER NOTIFICATION
06/19/17 2326   Provider Notification   Provider Name/Title JEANETTE Lindsay   Method of Notification At Bedside     Pt not feeling urge to push. Plan to labor down. Will reassess in 1 hour.

## 2017-06-20 NOTE — PLAN OF CARE
Problem: Goal Outcome Summary  Goal: Goal Outcome Summary  Outcome: Improving  Pt transferred to [facility] at 0415 via wheelchair. Bedside report was received from SOUMYA Aguayo. Room orientation completed.  folder provided. PP assessment WNL. Vital signs stable. PP assessment WNL. Pain managed with Ibuprofen and hot packs-see MAR. Breastfeeding infant on cues. Educated pt on importance of hand massage and expressing colostrum.  remains in room, positive support system. Will continue with pt plan of care.

## 2017-06-20 NOTE — PLAN OF CARE
Problem: Goal Outcome Summary  Goal: Goal Outcome Summary  Outcome: Improving  VSS. Pt complete at 2326. Labored down until urge to push at approx. 0130.  of viable Female with Wyatt Lindsay CNM in attendance.  Nursery RN present.  Infant with spontaneous cry, to mothers abdomen, dried and stimulated.  Apgars 9+9 .  Placenta delivered without complication, Pitocin bolused, 2nd laceration with repair, carli cares provided.  Mother and baby in stable condition.

## 2017-06-20 NOTE — TELEPHONE ENCOUNTER
"Had membranes stripped in clinic, since 14:00 started having \"mild contractions\" and for past hour, have been \"stronger\".  Currently every \"3-6 minutes\" apart.  Does need abx for GBS.      Reason for Disposition    MODERATE-SEVERE abdominal pain    Additional Information    Negative: Pregnant < 37 weeks (i.e., )    Negative: [1] Uncertain delivery date AND [2] possibly pregnant < 37 weeks (i.e., )    Negative: Passed out (i.e., lost consciousness, collapsed and was not responding)    Negative: Shock suspected (e.g., cold/pale/clammy skin, too weak to stand, low BP, rapid pulse)    Negative: Difficult to awaken or acting confused  (e.g., disoriented, slurred speech)    Negative: [1] SEVERE abdominal pain (e.g., excruciating) AND [2] constant AND [3] present > 1 hour    Negative: Severe bleeding (e.g., continuous red blood from vagina, or large blood clots)    Negative: Umbilical cord hanging out of the vagina (shiny, white, curled appearance, \"like telephone cord\")    Negative: Uncontrollable urge to push (i.e., feels like baby is coming out now)    Negative: Can see baby    Negative: Sounds like a life-threatening emergency to the triager    Negative: [1] History of prior delivery (multipara) AND [2] contractions < 10 minutes apart AND [3] present 1 hour    Negative: [1] First baby (primipara) AND [2] contractions < 6 minutes apart  AND [3] present 2 hours    Negative: [1] History of rapid prior delivery AND [2] contractions < 10 minutes apart    Negative: [1] Leakage of fluid from vagina AND [2] green or brown in color    Negative: [1] Leakage of fluid from vagina AND [2] leakage started > 4 hours ago    Negative: Vaginal bleeding or spotting    Negative: Baby moving less today (e.g., kick count < 5 in 1 hour or < 10 in 2 hours)    Negative: New hand or face swelling    Protocols used: PREGNANCY - LABOR-ADULT-    "

## 2017-06-20 NOTE — H&P
ADMISSION NOTE FOR Candy Rodriguez on 6/19/2017.    Candy Rodriguez is a 31 year old female     woman.      Estimated Date of Delivery: Antonio 15, 2017 is calculated from Patient's last menstrual period was 09/08/2016 (exact date). [facility]/2017 at 8:55 PM  in active labor.     Membranes are intact     Labor start time 1600.     PRENATAL COURSE   Prenatal care began at 8 wks gestation for a total of 12 prenatal visit.  Total wt gain 26#   Prenatal vital signs WNL   Prenatal course was   complicated by    Patient Active Problem List    Diagnosis Date Noted     Labor and delivery indication for care or intervention 06/19/2017     Priority: Medium     GBS (group B Streptococcus carrier), +RV culture, currently pregnant 05/27/2017     Priority: Medium     Allergic to PCN with rash as baby,  Has taken antibiotics without issues, maybe even Amoxicillin.  Low risk.  Appropriate for use of Ancef in labor.         Supervision of other normal pregnancy, antepartum 11/21/2016     Priority: Medium     FOB:  Zacarias    Sisters with another patient in the group, about 2 weeks behind her.        Need for Tdap vaccination 11/03/2016     Priority: Medium     TDAP GIVEN  eGnia Tre SALVADOR March 29, 2017           Multiple pigmented nevi 08/30/2015     Priority: Medium     Facial rhytids 07/31/2015     Priority: Medium     Irritant dermatitis 07/31/2015     Priority: Medium     CARDIOVASCULAR SCREENING; LDL GOAL LESS THAN 160 05/09/2010     Priority: Medium        HISTORIES   Allergies   Allergen Reactions     Sulfa Drugs Other (See Comments)     family history has never personally tried     Penicillins Rash     as a baby.        Past Medical History:   Diagnosis Date     Other acne       Past Surgical History:   Procedure Laterality Date     HC TOOTH EXTRACTION W/FORCEP  2001      Family History   Problem Relation Age of Onset     CEREBROVASCULAR DISEASE Maternal Grandmother      Alzheimer Disease  Maternal Grandmother      CEREBROVASCULAR DISEASE Maternal Grandfather      Cardiovascular Maternal Grandfather      Blood Disease Sister      ITP     CANCER Father      SCC and BCC      Social History   Substance Use Topics     Smoking status: Never Smoker     Smokeless tobacco: Never Used     Alcohol use No      Obstetric History       T1      L1     SAB0   TAB0   Ectopic0   Multiple0   Live Births0       # Outcome Date GA Lbr Jhonatan/2nd Weight Sex Delivery Anes PTL Lv   2 Current            1 Term 14 39w3d 03:51 / 02:11 3.62 kg (7 lb 15.7 oz) M Vag-Spont EPI,Local N NIKITA      Name: Porfirio      Apgar1:  9                Apgar5: 9           LABS:     Lab Results   Component Value Date    ABO O 2016           Lab Results   Component Value Date    RH  Pos 2016      Rhogam not indicated   No results found for: RUBELLAABIGG   No results found for: RPR   Lab Results   Component Value Date    HIV Negative 2008      Lab Results   Component Value Date    HGB 13.7 2017      Lab Results   Component Value Date    HEPBANG Nonreactive 2016      Lab Results   Component Value Date    GBS  2017     Positive  Positive: GBS DNA detected, presumed positive for GBS.   Assay performed on incubated broth culture of specimen using [employer] real-time   PCR.        Other significant lab:    None.     ROS   Review Of Systems  Skin: negative  Eyes: negative  Ears/Nose/Throat: negative  Respiratory: No shortness of breath, dyspnea on exertion, cough, or hemoptysis  Cardiovascular: negative  Gastrointestinal: negative  Genitourinary: negative  Musculoskeletal: negative  Neurologic: negative  Psychiatric: negative  Hematologic/Lymphatic/Immunologic: negative  Endocrine: negative     PHYSICAL EXAM:   Vital signs stable, afebrile and BP is   BP Readings from Last 3 Encounters:   17 145/88   17 125/76   17 126/69      General appearance healthy, alert, active and moderate  distress   Heart: RRR without murmur   Lungs: clear to auscultation   Neuro: denies headache and visual disturbances   Psych: Mentation normal and bright   Legs: 2+/2+, no clonus, no edema        Abdomen: gravid, single vertex fetus, non-tender, EFW 8 lbs 9 .   Pt is windy every 3-4 minutes, lasting 50 seconds and palpates moderate     FETAL HEART TONES: baseline 135 .  moderate FHRV variability.  No late decelerations or variable decelerations noted.  Category I fetal heart rate tracing.     PELVIC EXAM: 4.5/ 100/ Posterior/ soft/ -1   BLOODY SHOW:: no   Membranes as listed above.     ASSESSMENT:   IUP @ 40w4d in active labor.  NST not done.  Category  I  CST not done.  Maternal status is stable.   No diagnosis found.      PLAN:   Admit - see IP orders  Pain medication via epidural  Prophylactic antibiotic for + GBS status  Anticipate          Wyatt Lindsay CNM

## 2017-06-20 NOTE — ANESTHESIA PREPROCEDURE EVALUATION
Anesthesia Evaluation       history and physical reviewed . Pt has not had prior anesthetic     No history of anesthetic complications          ROS/MED HX    ENT/Pulmonary:  - neg pulmonary ROS     Neurologic:  - neg neurologic ROS     Cardiovascular:  - neg cardiovascular ROS       METS/Exercise Tolerance:     Hematologic:         Musculoskeletal:         GI/Hepatic:  - neg GI/hepatic ROS       Renal/Genitourinary:         Endo:         Psychiatric:         Infectious Disease:         Malignancy:         Other:                     Physical Exam  Normal systems: cardiovascular, pulmonary and dental    Airway   Mallampati: II  TM distance: > 3 FB  Neck ROM: full  Mouth opening: > 3 cm    Dental     Cardiovascular       Pulmonary           neg OB ROS                 Anesthesia Plan      History & Physical Review      ASA Status:  .  OB Epidural Asa: 2       Plan for     Ling Lemus MD  Anesthesiology resident   [facility]    Agree with plan for epidural      Postoperative Care      Consents  Anesthetic plan, risks, benefits and alternatives discussed with:  Patient..

## 2017-06-20 NOTE — L&D DELIVERY NOTE
Delivery Note for Candy Rodriguez     IUP at 40 weeks 4 days gestation delivered on 17 @ 0150.     delivery of a viable  pounds  ounces Female infant.  Apgars of 9 at 1 minute and 9 at 5 minutes.  Labor was spontaneous.  Medications administered  in labor:  Pain Rx Epidural; Antibiotics Yes: Ancef x 1; Other   Perineum: 2nd degree with repair with 3-0 Vicryl  Placenta-mechanism: spontaneous, intact,  with a 3 vessel cord. IV oxytocin was given.  Estimated Blood Loss was 375.  Complications of regency, labor and delivery: None  Birth attendants: Wyatt Lindsay CNM

## 2017-06-20 NOTE — PROGRESS NOTES
"SUBJECTIVE:  Comfortable overall with epidural except for area on R that is a window but tolerable.  Using PCA button prn    OBJECTIVE:  General appearance:  healthy, alert, mild distress and cooperative.    Overall comfortable      Blood pressure 128/76, pulse 86, temperature 97.8  F (36.6  C), temperature source Oral, resp. rate 18, height 1.651 m (5' 5\"), weight 77.1 kg (170 lb), last menstrual period 2016, SpO2 95 %, not currently breastfeeding.    FETAL HEART TONES: baseline 150 .  moderate FHRV variability.  No late decelerations bradycardia or marked variable decelerations noted.    CONTACTIONS: Contractions every 3 minutes.  Palpate: moderate  Pitocin- none,    GBS- positive     Antibiotics- Keflex 2 Gm at 2100             PELVIC EXAM: 10/ 100/ Mid/ soft/ 0   ROM: clear fluid     ASSESSMENT:  ==============  IUP @ 40w4d  Diagnosis active labor     Labor:  spontaneous  Category I fetal heart rate tracing.        PLAN:  ===========  Pain medication via epidural.  Prophylactic antibiotic for + GBS status  Anticipate   Laboring down protocol as she is not feeling pressure or urge to push.     Wyatt Lindsay, APRN CNM      "

## 2017-06-20 NOTE — PROVIDER NOTIFICATION
06/20/17 0030   Provider Notification   Provider Name/Title JAdi Neftali   Method of Notification In Department   Request Evaluate - Remote   Notification Reason Labor Status     Pt feeling intermittent rectal pressure, still no urge to push. Plan to continue to labor down.

## 2017-06-20 NOTE — PLAN OF CARE
Problem: Goal Outcome Summary  Goal: Goal Outcome Summary  Outcome: Improving  Data: Candy Rodriguez transferred to [id number] via wheelchair at 0415. Baby transferred via parent's arms.  Action: Receiving unit notified of transfer: Yes. Patient and family notified of room change. Report given to SOUMYA Pickett at 0415. Belongings sent to receiving unit. Accompanied by Registered Nurse. Oriented patient to surroundings. Call light within reach. ID bands double-checked with receiving RN.  Response: Patient tolerated transfer and is stable.

## 2017-06-20 NOTE — PROGRESS NOTES
Data: [facility] .   Reason for maternal/fetal assessment per patient is Rule Out Labor  .  Patient is a . Prenatal record reviewed.      Obstetric History       T1      L1     SAB0   TAB0   Ectopic0   Multiple0   Live Births1       # Outcome Date GA Lbr Jhonatan/2nd Weight Sex Delivery Anes PTL Lv   2 Current            1 Term 14 39w3d 03:51 / 02:11 3.62 kg (7 lb 15.7 oz) M Vag-Spont EPI,Local N NIKITA      Name: Porfirio      Apgar1:  9                Apgar5: 9      . Medical history:   Past Medical History:   Diagnosis Date     Other acne    . Gestational Age 40w4d. VSS. Fetal movement present. Patient denies backache, vaginal discharge, pelvic pressure, UTI symptoms, GI problems, bloody show, vaginal bleeding, edema, headache, visual disturbances, epigastric or URQ pain, rupture of membranes. Support person present.  Action: Verbal consent for EFM. Triage assessment completed. EFM applied for fetal well-being. Uterine assessment done, ctxs every 2-3 minutes. Fetal assessment: Presumed adequate fetal oxygenation documented (see flow record). SVE by provider, 4.5/100/-1.  Response: Wyatt Lindsay CNM informed of arrival and pt level of uncomfortablity. Plan per provider is admit pt for labor and pain management. Patient verbalized agreement with plan. Patient transferred to room 442 ambulatory, oriented to room and call light.

## 2017-06-20 NOTE — PLAN OF CARE
Problem: Goal Outcome Summary  Goal: Goal Outcome Summary  Outcome: Improving  VSS. Postpartum check WDL. Pain managed with Ibuprofen. Up ad malini. Voiding without difficulty. Breastfeeding with minimal assist for deeper latch and positioning.  at bedside and supportive.

## 2017-06-20 NOTE — ANESTHESIA PROCEDURE NOTES
Epidural Procedure Note    Staff:     Anesthesiologist:  JAKE TSANG    Resident/CRNA:  MASTER CHIU    Procedure performed by resident/CRNA in the presence of a teaching physician    Location: OB     Procedure start time:  6/19/2017 9:00 PM     Procedure end time:  6/19/2017 9:25 PM   Pre-procedure checklist:   patient identified, IV checked, site marked, risks and benefits discussed, informed consent, monitors and equipment checked, pre-op evaluation, at physician/surgeon's request and post-op pain management      Correct Patient: Yes      Correct Position: Yes      Correct Site: Yes      Correct Procedure: Yes      Correct Laterality:  Yes    Site Marked:  Yes  Procedure:     Procedure:  Epidural catheter    ASA:  1    Position:  Sitting    Sterile Prep: chloraprep      Insertion site:  L4-5    Local skin infiltration:  1% lidocaine    amount (mL):  3    Approach:  Midline    Needle gauge (G):  17    Needle Length (in):  3.5    Block Needle Type:  Stantonuhshayna    Injection Technique:  LORT saline    KATHIE at (cm):  5.5    Attempts:  1    Redirects:  0    Catheter gauge (G):  19    Catheter threaded easily: Yes      Threaded to cm at skin:  10    Paresthesias:  No    Aspiration negative for Heme or CSF: Yes      Test dose (mL):  3    Test dose time:  21:15    Test dose negative for signs of intravascular, subdural or intrathecal injection: Yes

## 2017-06-21 VITALS
HEART RATE: 66 BPM | SYSTOLIC BLOOD PRESSURE: 121 MMHG | WEIGHT: 170 LBS | BODY MASS INDEX: 28.32 KG/M2 | RESPIRATION RATE: 18 BRPM | TEMPERATURE: 98.4 F | HEIGHT: 65 IN | DIASTOLIC BLOOD PRESSURE: 75 MMHG | OXYGEN SATURATION: 100 %

## 2017-06-21 LAB — HGB BLD-MCNC: 12.1 G/DL (ref 11.7–15.7)

## 2017-06-21 PROCEDURE — 25000132 ZZH RX MED GY IP 250 OP 250 PS 637: Performed by: ADVANCED PRACTICE MIDWIFE

## 2017-06-21 PROCEDURE — 36415 COLL VENOUS BLD VENIPUNCTURE: CPT | Performed by: ADVANCED PRACTICE MIDWIFE

## 2017-06-21 PROCEDURE — 85018 HEMOGLOBIN: CPT | Performed by: ADVANCED PRACTICE MIDWIFE

## 2017-06-21 RX ADMIN — SENNOSIDES AND DOCUSATE SODIUM 2 TABLET: 8.6; 5 TABLET ORAL at 08:50

## 2017-06-21 RX ADMIN — IBUPROFEN 800 MG: 400 TABLET ORAL at 04:23

## 2017-06-21 RX ADMIN — IBUPROFEN 800 MG: 400 TABLET ORAL at 11:26

## 2017-06-21 NOTE — PLAN OF CARE
Problem: Goal Outcome Summary  Goal: Goal Outcome Summary  Outcome: Adequate for Discharge Date Met:  06/21/17  Data:  Pt is stable. Vital signs stable and postpartum assessments within normal limits. Pt is voiding, ambulating on the hallway and independent with her cares. pt is breastfeeding well on demand and has improved on deeper latching independently. Nipples are in tact, but pt is using hydrogel and her colostrum for nipple tenderness. Denies pain, but taking Ibuprofen for pain control.  Action: checked latch. Review of care plan, teaching, and discharge instructions done within the discharge class. Pt verification with signature obtained and a copy of the discharge instruction was given.  Response: pt states understanding and comfort with infant cares and feeding. All questions about baby care addressed. pt discharged home today and will be leaving at 1800 with baby due to the GBS status.

## 2017-06-21 NOTE — LACTATION NOTE
This note was copied from a baby's chart.  Note for today at 1020: Met with family on rounds, sore nipples & using hydrogels.  Mom reports nursing is going quite well, she's learned deeper latch and continues working on it.  Worked with Gisele yesterday & today, feeling better with latch.  She  her 2.5 yr old son, Porfirio for 18 months total without issues. Plans follow up @ [facility], already established there & worked with nurses there with last baby for breastfeeding help. Offer support and encouragement, review feeding log and lactation resource list.

## 2017-06-21 NOTE — PLAN OF CARE
Problem: Goal Outcome Summary  Goal: Goal Outcome Summary  Outcome: Improving  VSS, postpartum assessment WDL. Breastfeeding with minimal assist for deeper latch, good latch observed. Using hydrogels for sore nipples. Voiding freely. Pain managed with ibuprofen. Bonding well with baby. No concerns at this time.

## 2017-06-21 NOTE — DISCHARGE SUMMARY
"Post Partum Note  SIGNIFICANT PROBLEMS:        Patient Active Problem List     Diagnosis Date Noted      (normal spontaneous vaginal delivery) 2017       Priority: Medium       Female at 0150 on  with 2nd degree laceration repair.  .  Apgar 9/9.  GBS adequately treated in labor.       Labor and delivery indication for care or intervention 2017       Priority: Medium     GBS (group B Streptococcus carrier), +RV culture, currently pregnant 2017       Priority: Medium       Allergic to PCN with rash as baby,  Has taken antibiotics without issues, maybe even Amoxicillin.  Low risk.  Appropriate for use of Ancef in labor.       Supervision of other normal pregnancy, antepartum 2016       Priority: Medium       FOB:  Zacarias     Sisters with another patient in the group, about 2 weeks behind her.      Need for Tdap vaccination 2016       Priority: Medium       TDAP GIVEN  Cormier Tre SALVADOR 2017           Multiple pigmented nevi 2015       Priority: Medium     Facial rhytids 2015       Priority: Medium     Irritant dermatitis 2015       Priority: Medium     CARDIOVASCULAR SCREENING; LDL GOAL LESS THAN 160 2010       Priority: Medium         INTERVAL HISTORY:  /70  Pulse 81  Temp 97.9  F (36.6  C) (Oral)  Resp 18  Ht 1.651 m (5' 5\")  Wt 77.1 kg (170 lb)  LMP 2016 (Exact Date)  SpO2 100%  Breastfeeding? Unknown  BMI 28.29 kg/m2  Pt stable, baby is rooming in  Breast feeding status:initiated  Complications since 2 hours post delivery: None  Patient is tolerating acitivity well Voiding without difficulty, cramping is relieved by Ibuprophen, lochia is decreasing and patient denies clots.  Perineal pain is is relieved by Ibuprophen.  The perineum is intact        Postpartum hemoglobin         Hemoglobin   Date Value Ref Range Status   2017 12.1 11.7 - 15.7 g/dL Final      Blood type         Lab Results   Component Value Date     ABO " O 06/19/2017             Lab Results   Component Value Date     RH  Pos 06/19/2017      Rubella status No results found for: RUBELLAABIGG  History of depression: no     ASSESSMENT/PLAN:  Normal postpartum exam   Stable Post-partum day #1  Complications:none  Plan d/c home today, in evening if okay with peds  RTC 8 weeks, plan IUD insertion  Teaching done: D/C Instructions: Nutrition/Activity, Engorgement Management, Birth Control Options, Warning Signs/When to Call: Excessive Bleeding, Infection, PP Depression, Kegals and Crunches, RTC Clinic for PP Appointment and PNV  Birthcontrol planned:IUD Mirena, plan to insert at 6-8 wks postpartum      Current Discharge Medication List           CONTINUE these medications which have NOT CHANGED     Details   Prenatal Vit-Fe Fumarate-FA (PRENATAL VITAMIN PO) Take 1 tablet by mouth daily              Alysha Medina, RADHA, APRN CNM     Alysha Medina CNM,  CNM

## 2017-06-21 NOTE — PROGRESS NOTES
"Post Partum Note  SIGNIFICANT PROBLEMS:  Patient Active Problem List    Diagnosis Date Noted      (normal spontaneous vaginal delivery) 2017     Priority: Medium     Female at 0150 on  with 2nd degree laceration repair.  .  Apgar 9/9.  GBS adequately treated in labor.         Labor and delivery indication for care or intervention 2017     Priority: Medium     GBS (group B Streptococcus carrier), +RV culture, currently pregnant 2017     Priority: Medium     Allergic to PCN with rash as baby,  Has taken antibiotics without issues, maybe even Amoxicillin.  Low risk.  Appropriate for use of Ancef in labor.         Supervision of other normal pregnancy, antepartum 2016     Priority: Medium     FOB:  Zacarias    Sisters with another patient in the group, about 2 weeks behind her.        Need for Tdap vaccination 2016     Priority: Medium     TDAP GIVEN  Cormier Tre SALVADOR 2017           Multiple pigmented nevi 2015     Priority: Medium     Facial rhytids 2015     Priority: Medium     Irritant dermatitis 2015     Priority: Medium     CARDIOVASCULAR SCREENING; LDL GOAL LESS THAN 160 2010     Priority: Medium       INTERVAL HISTORY:  /70  Pulse 81  Temp 97.9  F (36.6  C) (Oral)  Resp 18  Ht 1.651 m (5' 5\")  Wt 77.1 kg (170 lb)  LMP 2016 (Exact Date)  SpO2 100%  Breastfeeding? Unknown  BMI 28.29 kg/m2  Pt stable, baby is rooming in  Breast feeding status:initiated  Complications since 2 hours post delivery: None  Patient is tolerating acitivity well Voiding without difficulty, cramping is relieved by Ibuprophen, lochia is decreasing and patient denies clots.  Perineal pain is is relieved by Ibuprophen.  The perineum is intact      Postpartum hemoglobin   Hemoglobin   Date Value Ref Range Status   2017 12.1 11.7 - 15.7 g/dL Final     Blood type   Lab Results   Component Value Date    ABO O 2017       Lab Results   Component " Value Date    RH  Pos 06/19/2017     Rubella status No results found for: RUBELLAABIGG  History of depression: no    ASSESSMENT/PLAN:  Normal postpartum exam   Stable Post-partum day #1  Complications:none  Plan d/c home today, in evening if okay with peds  RTC 8 weeks, plan IUD insertion  Teaching done: D/C Instructions: Nutrition/Activity, Engorgement Management, Birth Control Options, Warning Signs/When to Call: Excessive Bleeding, Infection, PP Depression, Kegals and Crunches, RTC Clinic for PP Appointment and PNV  Birthcontrol planned:IUD Mirena, plan to insert at 6-8 wks postpartum  Current Discharge Medication List      CONTINUE these medications which have NOT CHANGED    Details   Prenatal Vit-Fe Fumarate-FA (PRENATAL VITAMIN PO) Take 1 tablet by mouth daily           Alysha Medina CNM, APRN CNM    Alysha Medina CNM,  CNM

## 2017-07-01 ENCOUNTER — HEALTH MAINTENANCE LETTER (OUTPATIENT)
Age: 32
End: 2017-07-01

## 2017-08-17 ENCOUNTER — PRENATAL OFFICE VISIT (OUTPATIENT)
Dept: MIDWIFE SERVICES | Facility: CLINIC | Age: 32
End: 2017-08-17
Payer: COMMERCIAL

## 2017-08-17 VITALS
OXYGEN SATURATION: 100 % | WEIGHT: 154 LBS | HEART RATE: 65 BPM | DIASTOLIC BLOOD PRESSURE: 79 MMHG | SYSTOLIC BLOOD PRESSURE: 124 MMHG | BODY MASS INDEX: 25.63 KG/M2

## 2017-08-17 DIAGNOSIS — Z97.5 IUD (INTRAUTERINE DEVICE) IN PLACE: ICD-10-CM

## 2017-08-17 DIAGNOSIS — Z30.430 ENCOUNTER FOR IUD INSERTION: ICD-10-CM

## 2017-08-17 DIAGNOSIS — Z12.4 CERVICAL CANCER SCREENING: ICD-10-CM

## 2017-08-17 DIAGNOSIS — Z23 NEED FOR TDAP VACCINATION: ICD-10-CM

## 2017-08-17 DIAGNOSIS — O99.820 GBS (GROUP B STREPTOCOCCUS CARRIER), +RV CULTURE, CURRENTLY PREGNANT: ICD-10-CM

## 2017-08-17 PROCEDURE — G0145 SCR C/V CYTO,THINLAYER,RESCR: HCPCS | Performed by: ADVANCED PRACTICE MIDWIFE

## 2017-08-17 PROCEDURE — 99207 ZZC POST PARTUM EXAM: CPT | Performed by: ADVANCED PRACTICE MIDWIFE

## 2017-08-17 PROCEDURE — 87624 HPV HI-RISK TYP POOLED RSLT: CPT | Performed by: ADVANCED PRACTICE MIDWIFE

## 2017-08-17 PROCEDURE — 58300 INSERT INTRAUTERINE DEVICE: CPT | Performed by: ADVANCED PRACTICE MIDWIFE

## 2017-08-17 NOTE — PROGRESS NOTES
SUBJECTIVE:   Candy Rodriguez is a  here for her 6-week postpartum checkup.     HPI: Candy feels well, is adjusting to life with her  and has experienced a normal recovery without pain or complications.     Today's Depression Rating was 0    DELIVERY DATE: 17   of a viable girl, weight 8 pounds 13   oz., with no complications.  FEEDING METHOD:    CONTRACEPTION PLANNED: mini pill / progesterone only pill  She  has not had intercourse since delivery and complains of No discomfort.  HX OF DEPRESSION:No  HX OF ABUSE:No  OTHER HPI: She has no signs of infection, bleeding or other complications. Bleeding stopped, epis/lac healing well.         EXAM:  /79  Pulse 65  Wt 154 lb (69.9 kg)  LMP 2016 (Exact Date)  SpO2 100%  Breastfeeding? Yes  BMI 25.63 kg/m2  GENERAL APPEARANCE: healthy, alert and no distress  NECK: thyroid normal to palpation  BREAST: soft, nontender, nipples intact, lactating   ABDOMEN: soft, nontender, diastasis recti closing  PSYCH: mentation appears normal and affect normal/bright  PELVIC EXAM:  Vulva: BUS WNL, no lesions noted,   Vagina: Discharge normal and physiologic, no lesions, well rugated, good tone,   Cervix: Smooth, pink, no visible lesions, neg CMT,   Uterus: Normal size and position, non-tender, mobile,   Ovaries: No masses palpable, non-tender, mobile,   Pap: collected  Rectal exam: deferred        ASSESSMENT:   Normal postpartum exam after .    PLAN:  Birth Control as ordered. Fertility reviewed.    Return as needed or at time interval for next routine pap, pelvic, or breast exam.  Encourage Kegals and abdominal exercise. Slow, steady weight loss.  Continue a multi vitamin supplement, especially if breastfeeding.  Pap smear was obtained.  GC/CHLAMYDIA CULTURE OBTAINED:PATIENT DECLINED  Post partum Hgb was not obtained.    IUD INSERTION NOTE    Risks and bens were reviewed including risk of infection, perforation of uterine muscle, IUD  expulsion, and minimal risk of pregnancy.  Pt hx was reviewed and pt is appropriate candidate.      Mirena and Paragaurd were reviewed with benefits and risks of both considered.  Pt has chosen a Mirena IUD.    If choosing Mirena IUD, specific risks include irregular menses, amenorrhea and/or spotting especially in the next 3-6 months.  If choosing paragaurd, specific risks include heavier and more painful periods and regular NSAID use is recommended.    Pt was educated in checking for strings once a month and s/s of infection.    Verbal consent was obtained from the patient.    Exam:EXAM:  Constitutional: healthy, alert and no distress  Vagina and vulva are normal;  no discharge is noted.  Cervix normal without lesions. Uterus anteverted and mobile, normal in size and shape without tenderness.  Adnexa normal in size without masses or tenderness.       Procedure: Sterile speculum placed, cervical os cleansed with beta dine.  Tenaculum placed on anterior  cervix, uterus easily sounded to 8 cm.   IUD placed without difficulty, strings trimmed to approx 1 inch outside of cervical os.    NDC [id number] JEFFREY [id number] EXP 03/20    Pt tolerated procedure well with minimal cramping.  D/C home with instruction letter.  Call with questions or concerns.          Alysha Medina CNM,RADHA

## 2017-08-17 NOTE — MR AVS SNAPSHOT
After Visit Summary   8/17/2017    Candy Rodriguez    MRN: [id number]           Patient Information     Date Of Birth          1985        Visit Information        Provider Department      8/17/2017 12:15 PM Alysha Medina APRN CNM [facility]        Today's Diagnoses     Routine postpartum follow-up    -  1    GBS (group B Streptococcus carrier), +RV culture, currently pregnant        Need for Tdap vaccination        Encounter for IUD insertion        Cervical cancer screening        IUD (intrauterine device) in place           Follow-ups after your visit        Who to contact     If you have questions or need follow up information about today's clinic visit or your schedule please contact [facility] directly at [phone].  Normal or non-critical lab and imaging results will be communicated to you by "[employer]hart, letter or phone within 4 business days after the clinic has received the results. If you do not hear from us within 7 days, please contact the clinic through "[employer]hart or phone. If you have a critical or abnormal lab result, we will notify you by phone as soon as possible.  Submit refill requests through [employer] or call your pharmacy and they will forward the refill request to us. Please allow 3 business days for your refill to be completed.          Additional Information About Your Visit        MyChart Information     [employer] gives you secure access to your electronic health record. If you see a primary care provider, you can also send messages to your care team and make appointments. If you have questions, please call your primary care clinic.  If you do not have a primary care provider, please call [phone] and they will assist you.        Care EveryWhere ID     This is your Care EveryWhere ID. This could be used by other organizations to access your [place] medical records  [id number]        Your Vitals Were     Pulse Last Period Pulse  Oximetry Breastfeeding? BMI (Body Mass Index)       65 09/08/2016 (Exact Date) 100% Yes 25.63 kg/m2        Blood Pressure from Last 3 Encounters:   08/17/17 124/79   06/21/17 121/75   06/19/17 125/76    Weight from Last 3 Encounters:   08/17/17 154 lb (69.9 kg)   06/19/17 170 lb (77.1 kg)   06/19/17 170 lb (77.1 kg)              We Performed the Following     Beta HCG qual IFA urine     HC LEVONORGESTREL IU 52MG 5 YR     INSERTION INTRAUTERINE DEVICE     Pap imaged thin layer screen with HPV - recommended age 30 - 65 years (select HPV order below)          Today's Medication Changes          These changes are accurate as of: 8/17/17  1:32 PM.  If you have any questions, ask your nurse or doctor.               Start taking these medicines.        Dose/Directions    levonorgestrel 20 MCG/24HR IUD   Commonly known as:  MIRENA (52 MG)   Used for:  Encounter for IUD insertion   Started by:  Alysha Medina APRN CNM        Dose:  1 each   1 each (20 mcg) by Intrauterine route once for 1 dose   Quantity:  1 each   Refills:  0            Where to get your medicines      Some of these will need a paper prescription and others can be bought over the counter.  Ask your nurse if you have questions.     You don't need a prescription for these medications     levonorgestrel 20 MCG/24HR IUD                Primary Care Provider Office Phone # Fax #    Shell MAREK Vick [phone]       [address] [facility] [address]  [facility] [address]        Equal Access to Services     HUY GARCIA : Hadii deborah diazo Soomaali, waaxda luqadaha, qaybta kaalmada aderachel, waxay idiin hayaan adeeg kharash la'aan . So [facility] [phone].    ATENCIÓN: Si habla español, tiene a mckee disposición servicios gratuitos de asistencia lingüística. Llame al [phone].    We comply with applicable federal civil rights laws and [place] laws. We do not discriminate on the basis of race, color, national origin, age, disability  sex, sexual orientation or gender identity.            Thank you!     Thank you for choosing [facility]  for your care. Our goal is always to provide you with excellent care. Hearing back from our patients is one way we can continue to improve our services. Please take a few minutes to complete the written survey that you may receive in the mail after your visit with us. Thank you!             Your Updated Medication List - Protect others around you: Learn how to safely use, store and throw away your medicines at www.disposemymeds.org.          This list is accurate as of: 8/17/17  1:32 PM.  Always use your most recent med list.                   Brand Name Dispense Instructions for use Diagnosis    levonorgestrel 20 MCG/24HR IUD    MIRENA (52 MG)    1 each    1 each (20 mcg) by Intrauterine route once for 1 dose    Encounter for IUD insertion       PRENATAL VITAMIN PO      Take 1 tablet by mouth daily

## 2017-08-17 NOTE — NURSING NOTE
"Chief Complaint   Patient presents with     Post Partum Exam       Initial /79  Pulse 65  Wt 154 lb (69.9 kg)  LMP 2016 (Exact Date)  SpO2 100%  Breastfeeding? Yes  BMI 25.63 kg/m2 Estimated body mass index is 25.63 kg/(m^2) as calculated from the following:    Height as of 17: 5' 5\" (1.651 m).    Weight as of this encounter: 154 lb (69.9 kg).  BP completed using cuff size: regular        The following HM Due: pap smear      The following patient reported/Care Every where data was sent to:  P ABSTRACT QUALITY INITIATIVES [[id number]]  none     n/a             "

## 2017-08-21 LAB
COPATH REPORT: NORMAL
PAP: NORMAL

## 2017-08-23 LAB
FINAL DIAGNOSIS: NORMAL
HPV HR 12 DNA CVX QL NAA+PROBE: NEGATIVE
HPV16 DNA SPEC QL NAA+PROBE: NEGATIVE
HPV18 DNA SPEC QL NAA+PROBE: NEGATIVE
SPECIMEN DESCRIPTION: NORMAL

## 2017-10-14 ENCOUNTER — ALLIED HEALTH/NURSE VISIT (OUTPATIENT)
Dept: NURSING | Facility: CLINIC | Age: 32
End: 2017-10-14
Payer: COMMERCIAL

## 2017-10-14 DIAGNOSIS — Z23 NEED FOR PROPHYLACTIC VACCINATION AND INOCULATION AGAINST INFLUENZA: Primary | ICD-10-CM

## 2017-10-14 PROCEDURE — 99207 ZZC NO CHARGE NURSE ONLY: CPT

## 2017-10-14 PROCEDURE — 90686 IIV4 VACC NO PRSV 0.5 ML IM: CPT

## 2017-10-14 PROCEDURE — 90471 IMMUNIZATION ADMIN: CPT

## 2017-10-14 NOTE — MR AVS SNAPSHOT
After Visit Summary   10/14/2017    Candy Rodriguez    MRN: [id number]           Patient Information     Date Of Birth          1985        Visit Information        Provider Department      10/14/2017 10:50 AM [facility]        Today's Diagnoses     Need for prophylactic vaccination and inoculation against influenza    -  1       Follow-ups after your visit        Who to contact     If you have questions or need follow up information about today's clinic visit or your schedule please contact [facility] directly at [phone].  Normal or non-critical lab and imaging results will be communicated to you by [employer], letter or phone within 4 business days after the clinic has received the results. If you do not hear from us within 7 days, please contact the clinic through [employer]t or phone. If you have a critical or abnormal lab result, we will notify you by phone as soon as possible.  Submit refill requests through [employer] or call your pharmacy and they will forward the refill request to us. Please allow 3 business days for your refill to be completed.          Additional Information About Your Visit        MyChart Information     [employer] gives you secure access to your electronic health record. If you see a primary care provider, you can also send messages to your care team and make appointments. If you have questions, please call your primary care clinic.  If you do not have a primary care provider, please call [phone] and they will assist you.        Care EveryWhere ID     This is your Care EveryWhere ID. This could be used by other organizations to access your [place] medical records  [id number]         Blood Pressure from Last 3 Encounters:   08/17/17 124/79   06/21/17 121/75   06/19/17 125/76    Weight from Last 3 Encounters:   08/17/17 154 lb (69.9 kg)   06/19/17 170 lb (77.1 kg)   06/19/17 170 lb (77.1 kg)               We Performed the Following     HC FLU VAC PRESRV FREE QUAD SPLIT VIR 3+YRS IM     Vaccine Administration, Initial [[id number]]        Primary Care Provider Office Phone # Fax #    MAREK Barros [facility] [phone]       [address] [facility] [[email or web address] [address]  [facility] [address]        Equal Access to Services     [facility] HospitalLYUDMILA : Hadii aad ku hadasho Soomaali, waaxda luqadaha, qaybta kaalmada adeegyada, waxay idiin hayaan adeeg kharash la'aan . So [facility] [phone].    ATENCIÓN: Si habla español, tiene a mckee disposición servicios gratuitos de asistencia lingüística. [facility] [phone].    We comply with applicable federal civil rights laws and [place] laws. We do not discriminate on the basis of race, color, national origin, age, disability, sex, sexual orientation, or gender identity.            Thank you!     Thank you for choosing [facility]  for your care. Our goal is always to provide you with excellent care. Hearing back from our patients is one way we can continue to improve our services. Please take a few minutes to complete the written survey that you may receive in the mail after your visit with us. Thank you!             Your Updated Medication List - Protect others around you: Learn how to safely use, store and throw away your medicines at www.disposemymeds.org.          This list is accurate as of: 10/14/17 12:42 PM.  Always use your most recent med list.                   Brand Name Dispense Instructions for use Diagnosis    levonorgestrel 20 MCG/24HR IUD    MIRENA (52 MG)    1 each    1 each (20 mcg) by Intrauterine route once for 1 dose    Encounter for IUD insertion       PRENATAL VITAMIN PO      Take 1 tablet by mouth daily

## 2018-05-22 ENCOUNTER — OFFICE VISIT (OUTPATIENT)
Dept: MIDWIFE SERVICES | Facility: CLINIC | Age: 33
End: 2018-05-22
Payer: COMMERCIAL

## 2018-05-22 VITALS
HEART RATE: 59 BPM | WEIGHT: 143 LBS | SYSTOLIC BLOOD PRESSURE: 130 MMHG | BODY MASS INDEX: 23.8 KG/M2 | TEMPERATURE: 97.8 F | DIASTOLIC BLOOD PRESSURE: 79 MMHG

## 2018-05-22 DIAGNOSIS — Z30.431 IUD CHECK UP: Primary | ICD-10-CM

## 2018-05-22 PROCEDURE — 99212 OFFICE O/P EST SF 10 MIN: CPT | Performed by: ADVANCED PRACTICE MIDWIFE

## 2018-05-22 NOTE — NURSING NOTE
"Chief Complaint   Patient presents with     IUD     IUD check       Initial /79  Pulse 59  Temp 97.8  F (36.6  C) (Oral)  Wt 143 lb (64.9 kg)  BMI 23.8 kg/m2 Estimated body mass index is 23.8 kg/(m^2) as calculated from the following:    Height as of 17: 5' 5\" (1.651 m).    Weight as of this encounter: 143 lb (64.9 kg).  BP completed using cuff size: regular        The following HM Due: NONE      The following patient reported/Care Every where data was sent to:  P ABSTRACT QUALITY INITIATIVES [[id number]]  na      n/a              "

## 2018-05-22 NOTE — MR AVS SNAPSHOT
After Visit Summary   5/22/2018    Candy Rodriguez    MRN: [id number]           Patient Information     Date Of Birth          1985        Visit Information        Provider Department      5/22/2018 3:30 PM Beth Rodas APRN CNM [facility]        Today's Diagnoses     IUD check up    -  1       Follow-ups after your visit        Who to contact     If you have questions or need follow up information about today's clinic visit or your schedule please contact [facility] directly at [phone].  Normal or non-critical lab and imaging results will be communicated to you by [facility], letter or phone within 4 business days after the clinic has received the results. If you do not hear from us within 7 days, please contact the clinic through [employer] or phone. If you have a critical or abnormal lab result, we will notify you by phone as soon as possible.  Submit refill requests through [employer] or call your pharmacy and they will forward the refill request to us. Please allow 3 business days for your refill to be completed.          Additional Information About Your Visit        MyChart Information     [employer] gives you secure access to your electronic health record. If you see a primary care provider, you can also send messages to your care team and make appointments. If you have questions, please call your primary care clinic.  If you do not have a primary care provider, please call [phone] and they will assist you.        Care EveryWhere ID     This is your Care EveryWhere ID. This could be used by other organizations to access your [place] medical records  [id number]        Your Vitals Were     Pulse Temperature BMI (Body Mass Index)             59 97.8  F (36.6  C) (Oral) 23.8 kg/m2          Blood Pressure from Last 3 Encounters:   05/22/18 130/79   08/17/17 124/79   06/21/17 121/75    Weight from Last 3 Encounters:   05/22/18 143 lb (64.9 kg)   08/17/17  154 lb (69.9 kg)   06/19/17 170 lb (77.1 kg)              Today, you had the following     No orders found for display       Primary Care Provider Office Phone # Fax #    MAREK Barros [facility] [phone]       [address] [facility] [address]  [facility] [address]        Equal Access to Services     [facility]: Hadii aad ku hadasho Soomaali, waaxda luqadaha, qaybta kaalmada adeegyada, waxay idiin hayaan adeeg kharash la'aan . So [facility] [phone].    ATENCIÓN: Si habla español, tiene a mckee disposición servicios gratuitos de asistencia lingüística. Saraame al [phone].    We comply with applicable federal civil rights laws and [place] laws. We do not discriminate on the basis of race, color, national origin, age, disability, sex, sexual orientation, or gender identity.            Thank you!     Thank you for choosing [facility]  for your care. Our goal is always to provide you with excellent care. Hearing back from our patients is one way we can continue to improve our services. Please take a few minutes to complete the written survey that you may receive in the mail after your visit with us. Thank you!             Your Updated Medication List - Protect others around you: Learn how to safely use, store and throw away your medicines at www.disposemymeds.org.          This list is accurate as of 5/22/18  5:56 PM.  Always use your most recent med list.                   Brand Name Dispense Instructions for use Diagnosis    levonorgestrel 20 MCG/24HR IUD    MIRENA (52 MG)    1 each    1 each (20 mcg) by Intrauterine route once for 1 dose    Encounter for IUD insertion       PRENATAL VITAMIN PO      Take 1 tablet by mouth daily

## 2018-05-22 NOTE — PROGRESS NOTES
S:  Candy Rodriguez is a 32 year old  who presents today for evaluation of  Mirena IUD placement. She has had the IUD for about a year now. She had been able to find the strings easily but lately has not been able to feel them. She wanted to make sure the IUD was still in the right spot, does not want any more children. She has been having some spotting but it is random and far apart. She had this with the first IUD she had. Outside of not being able to feel the strings she likes the way the IUD is working and it is not causing any discomfort. No other questions or concerns today.     O:  /79  Pulse 59  Temp 97.8  F (36.6  C) (Oral)  Wt 143 lb (64.9 kg)  BMI 23.8 kg/m2  Pelvic exam: normal vagina and vulva, normal cervix without lesions or tenderness. Strings visible at os and appear to be normal length. IUD not visible in os. Appears to be in good location.     A:  IUD check     P:  Follow up for annual exams or PRN.     Beth Rodas CNM

## 2019-09-28 ENCOUNTER — HEALTH MAINTENANCE LETTER (OUTPATIENT)
Age: 34
End: 2019-09-28

## 2019-10-05 ENCOUNTER — IMMUNIZATION (OUTPATIENT)
Dept: NURSING | Facility: CLINIC | Age: 34
End: 2019-10-05
Payer: COMMERCIAL

## 2019-10-05 PROCEDURE — 90686 IIV4 VACC NO PRSV 0.5 ML IM: CPT

## 2019-10-05 PROCEDURE — 90471 IMMUNIZATION ADMIN: CPT

## 2020-08-22 ENCOUNTER — VIRTUAL VISIT (OUTPATIENT)
Dept: FAMILY MEDICINE | Facility: OTHER | Age: 35
End: 2020-08-22

## 2020-08-22 NOTE — PROGRESS NOTES
"Date: 2020 15:22:21  Clinician: Lew Vega  Clinician NPI: [id number]  Patient: Candy Rodriguez  Patient : 1985  Patient Address: [address] [place] [address]  Patient Phone: [phone]  Visit Protocol: URI  Patient Summary:  Candy is a 34 year old ( : 1985 ) female who initiated a Visit for COVID-19 (Coronavirus) evaluation and screening. When asked the question \"Please sign me up to receive news, health information and promotions. \", Candy responded \"No\".    When asked when her symptoms started, Candy reported that she does not have any symptoms.   She denies having recent facial or sinus surgery in the past 60 days and taking antibiotic medication in the past month.    Pertinent COVID-19 (Coronavirus) information  In the past 14 days, Candy has not worked in a congregate living setting.   She does not work or volunteer as healthcare worker or a  and does not work or volunteer in a healthcare facility.   Candy also has not lived in a congregate living setting in the past 14 days. She does not live with a healthcare worker.   Candy has had a close contact with a laboratory-confirmed COVID-19 patient in the last 14 days. Additional information about contact with COVID-19 (Coronavirus) patient as reported by the patient (free text): My 3 year old daughter received a positive test result today, so I'm trying to get testing appointments schedule for myself, my , and my son since we all live together.    Patient reported they are living in the same household with a COVID-19 positive patient.  Since 2019, Candy and has not had upper respiratory infection or influenza-like illness. Has not been diagnosed with lab-confirmed COVID-19 test   Pertinent medical history  Candy does not get yeast infections when she takes antibiotics.   Candy does not need a return to work/school note.   Weight: 140 lbs   Candy does not smoke or use smokeless tobacco.   She denies " pregnancy and denies breastfeeding. She has menstruated in the past month.   Weight: 140 lbs    MEDICATIONS: No current medications, ALLERGIES: NKDA  Clinician Response:  Dear Candy,     Based on the details you've shared, you may have been exposed to coronavirus (COVID-19). You do not need to be tested at this time.  What should I do?  For safety, it's very important to follow these rules. Do this for 14 days after the date you were last exposed to the virus.  Stay home and away from others. Don't go to work, school or anywhere else.  No hugging, kissing or shaking hands.  Don't let anyone visit.  Cover your mouth and nose with a mask, tissue or washcloth to avoid spreading germs.  Wash your hands and face often. Use soap and water.  What are the symptoms of COVID-19?  The most common symptoms are cough, fever and trouble breathing. Less common symptoms include headache, body aches, fatigue (feeling very tired), chills, sore throat, stuffy or runny nose, diarrhea (loose poop), loss of taste or smell, belly pain, and nausea or vomiting (feeling sick to your stomach or throwing up).  After 14 days, if you have still don't have symptoms, you likely don't have this virus.  If you develop symptoms, follow these guidelines.  If you're normally healthy: Please start another [facility] visit to report your symptoms. Go to [email or web address].  If you have a serious health problem (like cancer, heart failure, an organ transplant or kidney disease): Call your specialty clinic. Let them know that you might have COVID-19.  Where can I get more information?    [employer] [place] -- About COVID-19: [email or web address]  CDC -- What to Do If You're Sick: www.cdc.gov/coronavirus/2019-ncov/about/steps-when-sick.html  CDC -- Ending Home Isolation: www.cdc.gov/coronavirus/2019-ncov/hcp/disposition-in-home-patients.html  CDC -- Caring for Someone: www.cdc.gov/coronavirus/2019-ncov/if-you-are-sick/care-for-someone.html  [facility] -- Interim  Guidance for Hospital Discharge to Home: www.health.[facility].[place]/diseases/coronavirus/hcp/hospdischarge.pdf  [facility] clinical trials (COVID-19 research studies): clinicalaffairs.[facility]/umn-clinical-trials  Below are the COVID-19 hotlines at the [place] Department of Health ([facility]). Interpreters are available.   For health questions: Call 048-047-0060 or 1-462.739.4846 (7 a.m. to 7 p.m.) For questions about schools and childcare: Call 252-060-0149 or 1-247.985.1722 (7 a.m. to 7 p.m.)     .      Diagnosis: Contact with and (suspected) exposure to other viral communicable diseases  Diagnosis ICD: Z20.828

## 2021-01-10 ENCOUNTER — HEALTH MAINTENANCE LETTER (OUTPATIENT)
Age: 36
End: 2021-01-10

## 2021-06-03 ASSESSMENT — ANXIETY QUESTIONNAIRES
GAD7 TOTAL SCORE: 2
6. BECOMING EASILY ANNOYED OR IRRITABLE: SEVERAL DAYS
2. NOT BEING ABLE TO STOP OR CONTROL WORRYING: NOT AT ALL
3. WORRYING TOO MUCH ABOUT DIFFERENT THINGS: NOT AT ALL
7. FEELING AFRAID AS IF SOMETHING AWFUL MIGHT HAPPEN: NOT AT ALL
5. BEING SO RESTLESS THAT IT IS HARD TO SIT STILL: NOT AT ALL
7. FEELING AFRAID AS IF SOMETHING AWFUL MIGHT HAPPEN: NOT AT ALL
1. FEELING NERVOUS, ANXIOUS, OR ON EDGE: SEVERAL DAYS
GAD7 TOTAL SCORE: 2
4. TROUBLE RELAXING: NOT AT ALL

## 2021-06-03 ASSESSMENT — ENCOUNTER SYMPTOMS
BREAST PAIN: 1
BREAST MASS: 0

## 2021-06-04 ENCOUNTER — OFFICE VISIT (OUTPATIENT)
Dept: OBGYN | Facility: CLINIC | Age: 36
End: 2021-06-04
Attending: ADVANCED PRACTICE MIDWIFE
Payer: COMMERCIAL

## 2021-06-04 VITALS
BODY MASS INDEX: 22.66 KG/M2 | HEART RATE: 73 BPM | HEIGHT: 66 IN | SYSTOLIC BLOOD PRESSURE: 121 MMHG | WEIGHT: 141 LBS | DIASTOLIC BLOOD PRESSURE: 85 MMHG

## 2021-06-04 DIAGNOSIS — Z00.00 ROUTINE GENERAL MEDICAL EXAMINATION AT A HEALTH CARE FACILITY: Primary | ICD-10-CM

## 2021-06-04 DIAGNOSIS — Z00.00 VISIT FOR PREVENTIVE HEALTH EXAMINATION: ICD-10-CM

## 2021-06-04 DIAGNOSIS — T83.32XA INTRAUTERINE CONTRACEPTIVE DEVICE THREADS LOST, INITIAL ENCOUNTER: ICD-10-CM

## 2021-06-04 PROCEDURE — 99385 PREV VISIT NEW AGE 18-39: CPT | Performed by: ADVANCED PRACTICE MIDWIFE

## 2021-06-04 PROCEDURE — G0463 HOSPITAL OUTPT CLINIC VISIT: HCPCS

## 2021-06-04 RX ORDER — TRETINOIN 0.5 MG/G
1 CREAM TOPICAL DAILY PRN
COMMUNITY
Start: 2019-01-01

## 2021-06-04 RX ORDER — SPIRONOLACTONE 100 MG/1
100 TABLET, FILM COATED ORAL DAILY
COMMUNITY
Start: 2019-01-01 | End: 2024-05-02

## 2021-06-04 SDOH — ECONOMIC STABILITY: TRANSPORTATION INSECURITY
IN THE PAST 12 MONTHS, HAS THE LACK OF TRANSPORTATION KEPT YOU FROM MEDICAL APPOINTMENTS OR FROM GETTING MEDICATIONS?: NO

## 2021-06-04 SDOH — HEALTH STABILITY: PHYSICAL HEALTH: ON AVERAGE, HOW MANY DAYS PER WEEK DO YOU ENGAGE IN MODERATE TO STRENUOUS EXERCISE (LIKE A BRISK WALK)?: 6 DAYS

## 2021-06-04 SDOH — HEALTH STABILITY: MENTAL HEALTH
STRESS IS WHEN SOMEONE FEELS TENSE, NERVOUS, ANXIOUS, OR CAN'T SLEEP AT NIGHT BECAUSE THEIR MIND IS TROUBLED. HOW STRESSED ARE YOU?: ONLY A LITTLE

## 2021-06-04 SDOH — ECONOMIC STABILITY: FOOD INSECURITY: WITHIN THE PAST 12 MONTHS, YOU WORRIED THAT YOUR FOOD WOULD RUN OUT BEFORE YOU GOT MONEY TO BUY MORE.: NEVER TRUE

## 2021-06-04 SDOH — HEALTH STABILITY: PHYSICAL HEALTH: ON AVERAGE, HOW MANY MINUTES DO YOU ENGAGE IN EXERCISE AT THIS LEVEL?: NOT ASKED

## 2021-06-04 SDOH — ECONOMIC STABILITY: FOOD INSECURITY: WITHIN THE PAST 12 MONTHS, THE FOOD YOU BOUGHT JUST DIDN'T LAST AND YOU DIDN'T HAVE MONEY TO GET MORE.: NEVER TRUE

## 2021-06-04 SDOH — ECONOMIC STABILITY: TRANSPORTATION INSECURITY
IN THE PAST 12 MONTHS, HAS LACK OF TRANSPORTATION KEPT YOU FROM MEETINGS, WORK, OR FROM GETTING THINGS NEEDED FOR DAILY LIVING?: NO

## 2021-06-04 SDOH — ECONOMIC STABILITY: INCOME INSECURITY: HOW HARD IS IT FOR YOU TO PAY FOR THE VERY BASICS LIKE FOOD, HOUSING, MEDICAL CARE, AND HEATING?: NOT HARD AT ALL

## 2021-06-04 ASSESSMENT — ENCOUNTER SYMPTOMS
HOT FLASHES: 0
ABDOMINAL PAIN: 0
DECREASED APPETITE: 0
BREAST PAIN: 1
COUGH DISTURBING SLEEP: 0
PANIC: 0
JOINT SWELLING: 0
WEIGHT GAIN: 0
SLEEP DISTURBANCES DUE TO BREATHING: 0
HEMOPTYSIS: 0
EYE IRRITATION: 0
NAUSEA: 0
POLYDIPSIA: 0
EYE PAIN: 0
POLYPHAGIA: 0
NUMBNESS: 0
SPEECH CHANGE: 0
DYSURIA: 0
CONSTIPATION: 0
POSTURAL DYSPNEA: 0
NECK PAIN: 0
HEADACHES: 0
TROUBLE SWALLOWING: 0
DIARRHEA: 0
BRUISES/BLEEDS EASILY: 0
HYPOTENSION: 0
TINGLING: 0
DECREASED CONCENTRATION: 0
BLOOD IN STOOL: 0
BREAST MASS: 0
WEAKNESS: 0
SINUS CONGESTION: 0
VOMITING: 0
INCREASED ENERGY: 0
SINUS PAIN: 0
TREMORS: 0
MYALGIAS: 0
SYNCOPE: 0
LOSS OF CONSCIOUSNESS: 0
NERVOUS/ANXIOUS: 0
PALPITATIONS: 0
BOWEL INCONTINENCE: 0
SNORES LOUDLY: 0
ALTERED TEMPERATURE REGULATION: 0
WHEEZING: 0
STIFFNESS: 0
PARALYSIS: 0
SPUTUM PRODUCTION: 0
FATIGUE: 0
RESPIRATORY PAIN: 0
BACK PAIN: 0
ORTHOPNEA: 0
HALLUCINATIONS: 0
SKIN CHANGES: 0
EYE REDNESS: 0
SORE THROAT: 0
LIGHT-HEADEDNESS: 0
POOR WOUND HEALING: 0
LEG PAIN: 0
SWOLLEN GLANDS: 0
SEIZURES: 0
DISTURBANCES IN COORDINATION: 0
COUGH: 0
DYSPNEA ON EXERTION: 0
EXERCISE INTOLERANCE: 0
DIFFICULTY URINATING: 0
CLAUDICATION: 0
RECTAL PAIN: 0
NAIL CHANGES: 0
DECREASED LIBIDO: 0
EYE WATERING: 0
FEVER: 0
NIGHT SWEATS: 0
MUSCLE WEAKNESS: 0
EXTREMITY NUMBNESS: 0
HEARTBURN: 0
BLOATING: 0
SMELL DISTURBANCE: 0
HOARSE VOICE: 0
TACHYCARDIA: 0
HEMATURIA: 0
CHILLS: 0
LEG SWELLING: 0
MUSCLE CRAMPS: 0
JAUNDICE: 0
WEIGHT LOSS: 0
MEMORY LOSS: 0
SHORTNESS OF BREATH: 0
DOUBLE VISION: 0
DEPRESSION: 0
TASTE DISTURBANCE: 0
INSOMNIA: 0
FLANK PAIN: 0
ARTHRALGIAS: 0
HYPERTENSION: 0
RECTAL BLEEDING: 0
NECK MASS: 0
DIZZINESS: 0

## 2021-06-04 ASSESSMENT — MIFFLIN-ST. JEOR: SCORE: 1333.38

## 2021-06-04 ASSESSMENT — PATIENT HEALTH QUESTIONNAIRE - PHQ9: SUM OF ALL RESPONSES TO PHQ QUESTIONS 1-9: 0

## 2021-06-04 ASSESSMENT — PAIN SCALES - GENERAL: PAINLEVEL: NO PAIN (0)

## 2021-06-04 ASSESSMENT — ANXIETY QUESTIONNAIRES: GAD7 TOTAL SCORE: 2

## 2021-06-04 NOTE — PATIENT INSTRUCTIONS
Preventive Health Recommendations  Female Ages 26 - 39  Yearly exam:   See your health care provider every year in order to    Review health changes.     Discuss preventive care.      Review your medicines if you your doctor has prescribed any.    Until age 30: Get a Pap test every three years (more often if you have had an abnormal result).    After age 30: Talk to your doctor about whether you should have a Pap test every 3 years or have a Pap test with HPV screening every 5 years.   You do not need a Pap test if your uterus was removed (hysterectomy) and you have not had cancer.  You should be tested each year for STDs (sexually transmitted diseases), if you're at risk.   Talk to your provider about how often to have your cholesterol checked.  If you are at risk for diabetes, you should have a diabetes test (fasting glucose).  Shots: Get a flu shot each year. Get a tetanus shot every 10 years.   Nutrition:     Eat at least 5 servings of fruits and vegetables each day.    Eat whole-grain bread, whole-wheat pasta and brown rice instead of white grains and rice.    Get adequate Calcium and Vitamin D.     Lifestyle    Exercise at least 150 minutes a week (30 minutes a day, 5 days of the week). This will help you control your weight and prevent disease.    Limit alcohol to one drink per day.    No smoking.     Wear sunscreen to prevent skin cancer.    See your dentist every six months for an exam and cleaning.    PREVENTIVE HEALTH RECOMMENDATIONS:   Most women need a yearly breast and pelvic exam.    A PAP screen, a test done DURING a pelvic exam, is NO longer recommended yearly.    March 2013, screening guidelines recommended by ACOG for PAP screen are:    1) First pap at age 21.    2) Pap every 3 years until age 30.    3) After age 30, pap every 3 years or Pap with HR HPV screen every 5 years until age 65.  4) Women do NOT need a vaginal Pap screen after a hysterectomy (surgical removal of the uterus) when they  have not had cancer.    Exceptions:  1) Yearly pap if HIV+ or immunosuppressed secondary to organ transplant  2) NADYA II-III continue routine screening for 20 years.    I encourage you continue looking for opportunities to choose a healthy lifestyle:       * Choose to eat a heart healthy diet. Check out the FOOD PLATE guidelines at: http://www.choosemyplate.gov/ for helpful hints on weight and cholesterol management.  Balance your caloric intake with exercise to maintain a BMI in the 22 to 26 range. For bone health: Eat calcium-rich foods like yogurt, broccoli or take chewable calcium pills (500 to 600 mg) twice a day with food.       * Exercise for at least an average of 30 minutes a day, 5 days of the week. This will help you control your weight, release stress, and help prevent disease.      * Take a Vitamin D3 supplement daily fall through spring and during summer unless you [id number]' full body sun exposure to skin without sunscreen.      * DO wear sunscreen to prevent skin cancer after the first 15-30 minutes.      * Identify stressors in your life, find ways to release the stress, and, make time for yourself. PLEASE ask for help if mood changes last longer than two weeks.     * Limit alcohol to one drink per day.  No smoking.  Avoid second hand smoke. If you smoke, ask for help to stop.       *  If you are in a sexual relationship, talk with your partner about possible infection risks and take action to protect yourself from exposure to a sexual infection.    Please request an infection screen for STIs (sexually transmitted infections) if you are less than age 26 OR believe that you may be at risk.     Get a flu shot each year. Get a tetanus shot every 10 years. EVERYONE needs a pertussis (Whooping cough) booster.    See your dentist twice a year for an exam and preventive care cleaning.     Consider the following screen tests:    1) cholesterol test every 5 years.     2) yearly mammogram after age 40 unless  you have identified risks.    3) colonoscopy every 10 years after age 50 unless you have identified risks.    4) diabetes blood test screening if you are at risk for diabetes.      Additional information that you may also find helpful:  The Internet now gives us access to LOTS of information -- some of it helpful, research documented and also plenty of harmful, anecdotal information that may not pertain to your situtaion. Consider visiting the following websites for accurate health information:    www.vitamindcouncil.org/ : Info and ongoing research re Vitamin D    www.fairview.org : Up to date and easily searchable information on multiple topics.    www.medlineplus.gov : medication info, interactive tutorials, watch real surgeries online    www.cdc.gov : public health info, travel advisories, epidemics (H1N1)    www.tammie/std.org: current research re diagnosis, treatment and prevention of sexually contacted infections.    www.health.state.[place] : [place] dept of heatl, public health issues in [place], N1N1    www.familydoctor.org : good info from the Academy of Family Physicians

## 2021-06-04 NOTE — LETTER
2021       RE: Candy Rodriguez  [address] [facility] [address] [facility] [address]     Dear Colleague,    Thank you for referring your patient, Candy Rodriguez, to the [facility] [place] at [facility]. Please see a copy of my visit note below.    Answers for HPI/ROS submitted by the patient on 6/3/2021   JUAN 7 TOTAL SCORE: 2    Progress Note    SUBJECTIVE:  Candy Rodriguez is an 35 year old, , who requests an Annual Preventive Exam.     Concerns today include:  - pain below left nipple intermittently noticed for the past two months. Does not appear to be related to menstrual cycle.  - interested in IUD string check    Menstrual History:  Menstrual History 2016 11/3/2016 2016   LAST MENSTRUAL PERIOD 2016       Last    Lab Results   Component Value Date    PAP NIL 2017     History of abnormal Pap smear: NO - age 30-65 PAP every 5 years with negative HPV co-testing recommended    Last   Lab Results   Component Value Date    HPV16 Negative 2017     Last   Lab Results   Component Value Date    HPV18 Negative 2017     Last   Lab Results   Component Value Date    HRHPV Negative 2017       Mammogram current: not applicable  Last Mammogram:   No results found.     HISTORY:  spironolactone (ALDACTONE) 100 MG tablet, 100 mg daily  tretinoin (RETIN-A) 0.05 % external cream, 1 g daily as needed  levonorgestrel (MIRENA, 52 MG,) 20 MCG/24HR IUD, 1 each (20 mcg) by Intrauterine route once for 1 dose    No current facility-administered medications on file prior to visit.     Allergies   Allergen Reactions     Sulfa Drugs Other (See Comments)     family history has never personally tried     Penicillins Rash     as a baby.       Immunization History   Administered Date(s) Administered     COVID-19,PF,Moderna 04/10/2021, 2021     Flu, Unspecified 10/04/2018     HEPA 2012, 2014     HPV  2007, 10/08/2007, 2008     HPV Quadrivalent 2007, 10/08/2007, 2008     HepA-Adult 2012, 2014     HepB 1998, 2001, 2001     Influenza (H1N1) 2010     Influenza (IIV3) PF 11/15/2008, 2009, 10/01/2013     Influenza Vaccine IM > 6 months Valent IIV4 2013, 10/28/2014, 10/03/2015, 10/13/2016, 10/14/2017, 10/05/2019     Influenza,INJ,MDCK,PF,Quad >4yrs 2020     MMR 1985, 1996     Meningococcal (Menomune ) 2002     TD (ADULT, 7+) 1996, 2006     TDAP Vaccine (Adacel) 2011, 2017     TDAP Vaccine (Boostrix) 2014     Tdap (Adacel,Boostrix) 2011, 2014, 2017     Typhoid IM 2014     Varicella 2001, 2001       OB History    Para Term  AB Living   2 2 2 0 0 2   SAB TAB Ectopic Multiple Live Births   0 0 0 0 2     Past Medical History:   Diagnosis Date     Other acne      Past Surgical History:   Procedure Laterality Date     HC TOOTH EXTRACTION W/FORCEP       Family History   Problem Relation Age of Onset     Cerebrovascular Disease Maternal Grandmother      Alzheimer Disease Maternal Grandmother      Cerebrovascular Disease Maternal Grandfather      Cardiovascular Maternal Grandfather      Cancer Father         SCC and BCC, bladder     Blood Disease Sister         ITP     Social History     Socioeconomic History     Marital status:      Spouse name: Zacarias Rodriguez     Number of children: 0     Years of education: None     Highest education level: None   Occupational History     Occupation:      Employer: [employer]     Comment: [employer]   Social Needs     Financial resource strain: Not hard at all     Food insecurity     Worry: Never true     Inability: Never true     Transportation needs     Medical: No     Non-medical: No   Tobacco Use     Smoking status: Never Smoker     Smokeless tobacco: Never Used   Substance and Sexual Activity      Alcohol use: Yes     Alcohol/week: 0.0 standard drinks     Comment: 1-2 drinks/week     Drug use: No     Sexual activity: Yes     Partners: Male     Birth control/protection: I.U.D.   Lifestyle     Physical activity     Days per week: 6 days     Minutes per session: None     Stress: Only a little   Relationships     Social connections     Talks on phone: None     Gets together: None     Attends Gnosticist service: None     Active member of club or organization: None     Attends meetings of clubs or organizations: None     Relationship status: None     Intimate partner violence     Fear of current or ex partner: None     Emotionally abused: None     Physically abused: None     Forced sexual activity: None   Other Topics Concern     Parent/sibling w/ CABG, MI or angioplasty before 65F 55M? No      Service No     Blood Transfusions No     Caffeine Concern Not Asked     Occupational Exposure Not Asked     Hobby Hazards Not Asked     Sleep Concern Not Asked     Stress Concern Not Asked     Weight Concern Not Asked     Special Diet Not Asked     Back Care Not Asked     Exercise Not Asked     Bike Helmet Not Asked     Seat Belt Yes     Self-Exams Not Asked   Social History Narrative    Caffeine intake/servings daily - 2 cups    Calcium intake/servings daily - 3 day    Exercise 3-4 times weekly - describe running and spin bike and dog walking.     Sunscreen used - Yes    Seatbelts used - Yes    Guns stored in the home - Yes and No    Self Breast Exam - Yes    Pap test up to date -  Yes    Eye exam up to date - No    Dental exam up to date - No    DEXA scan up to date -  Yes and Not Applicable    Flex Sig/Colonoscopy up to date -  Yes and Not Applicable    Mammography up to date -  Yes and Not Applicable    Immunizations reviewed and up to date - Yes    Abuse: Current or Past (Physical, Sexual or Emotional) - Yes and No    Do you feel safe in your environment - Yes    Do you cope well with stress - Yes    Do you  suffer from insomnia - No    Last updated by: Deb Neville  11/3/2016        Reviewed [facility] [place] 6-4-21               Review of Systems     Constitutional:  Negative for fever, chills, weight loss, weight gain, fatigue, decreased appetite, night sweats, recent stressors, height gain, height loss, post-operative complications, incisional pain, hallucinations, increased energy, hyperactivity and confused.   HENT:  Negative for ear pain, hearing loss, tinnitus, nosebleeds, trouble swallowing, hoarse voice, mouth sores, sore throat, ear discharge, tooth pain, gum tenderness, taste disturbance, smell disturbance, hearing aid, bleeding gums, dry mouth, sinus pain, sinus congestion and neck mass.    Eyes:  Negative for double vision, pain, redness, eye pain, decreased vision, eye watering, eye bulging, eye dryness, flashing lights, spots, floaters, strabismus, tunnel vision, jaundice and eye irritation.   Respiratory:   Negative for cough, hemoptysis, sputum production, shortness of breath, wheezing, sleep disturbances due to breathing, snores loudly, respiratory pain, dyspnea on exertion, cough disturbing sleep and postural dyspnea.    Cardiovascular:  Negative for chest pain, dyspnea on exertion, palpitations, orthopnea, claudication, leg swelling, fingers/toes turn blue, hypertension, hypotension, syncope, history of heart murmur, chest pain on exertion, chest pain at rest, pacemaker, few scattered varicosities, leg pain, sleep disturbances due to breathing, tachycardia, light-headedness, exercise intolerance and edema.   Gastrointestinal:  Negative for heartburn, nausea, vomiting, abdominal pain, diarrhea, constipation, blood in stool, melena, rectal pain, bloating, hemorrhoids, bowel incontinence, jaundice, rectal bleeding, coffee ground emesis and change in stool.   Genitourinary:  Negative for bladder incontinence, dysuria, urgency, hematuria, flank pain, vaginal discharge, difficulty urinating, genital sores,  "dyspareunia, decreased libido, nocturia, voiding less frequently, arousal difficulty, abnormal vaginal bleeding, excessive menstruation, menstrual changes, hot flashes, vaginal dryness and postmenopausal bleeding.   Musculoskeletal:  Negative for myalgias, back pain, joint swelling, arthralgias, stiffness, muscle cramps, neck pain, bone pain, muscle weakness and fracture.   Skin:  Negative for nail changes, itching, poor wound healing, rash, hair changes, skin changes, acne, warts, poor wound healing, scarring, flaky skin, Raynaud's phenomenon, sensitivity to sunlight and skin thickening.   Neurological:  Negative for dizziness, tingling, tremors, speech change, seizures, loss of consciousness, weakness, light-headedness, numbness, headaches, disturbances in coordination, extremity numbness, memory loss, difficulty walking and paralysis.   Endo/Heme:  Negative for anemia, swollen glands and bruises/bleeds easily.   Psychiatric/Behavioral:  Negative for depression, hallucinations, memory loss, decreased concentration, mood swings and panic attacks.    Breast:  Positive for breast pain. Negative for breast discharge, breast mass and nipple retraction.   Endocrine:  Negative for altered temperature regulation, polyphagia, polydipsia, unwanted hair growth and change in facial hair.    [unfilled]  PHQ-9 SCORE 12/30/2014 12/26/2016 2/24/2017   PHQ-9 Total Score 0 - -   PHQ-9 Total Score - 0 0     JUAN-7 SCORE 6/3/2021   Total Score 2 (minimal anxiety)   Total Score 2     EXAM:  Blood pressure 121/85, pulse 73, height 1.664 m (5' 5.5\"), weight 64 kg (141 lb), currently breastfeeding. Body mass index is 23.11 kg/m .  General - pleasant female in no acute distress.  Skin - no suspicious lesions or rashes  EENT-  PERRLA, euthyroid with out palpable nodules  Neck - supple without lymphadenopathy.  Lungs - clear to auscultation bilaterally.  Heart - regular rate and rhythm without murmur.  Abdomen - soft, nontender, nondistended, " no masses or organomegaly noted.  Musculoskeletal - no gross deformities.  Neurological - normal strength, sensation, and mental status.    Breast Exam:  Breast: Without visible skin changes. No dimpling or lesions seen.   Breasts supple, non-tender with palpation, no dominant mass, nodularity, or nipple discharge noted bilaterally. Axillary nodes negative.      Pelvic Exam:  EG/BUS: Normal genital architecture without lesions, erythema or abnormal secretions. Normal genital architecture without lesions, erythema or abnormal secretions   Urethral meatus: normal   Vagina: moist, pink, rugae with creamy, white, odorless and blood tinged  secretions  Cervix: Multiparous, pink, moist, closed, without lesion or CMT and IUD strings not palpable or visible. Attempted to tease strings from cervix with cotton swab with no success.   Uterus: midposition, and small, smooth, firm, mobile w/o pain  Adnexa: Within normal limits and No masses, nodularity, tenderness  Rectum:anus normal     ASSESSMENT:  Encounter Diagnoses   Name Primary?     Routine general medical examination at a health care facility Yes     Intrauterine contraceptive device threads lost, initial encounter         PLAN:   Orders Placed This Encounter   Procedures     US Transvaginal Non OB     25- OH-Vitamin D     Glucose     TSH with free T4 reflex     Lipid Profile     CBC with Platelets     Orders Placed This Encounter   Medications     tretinoin (RETIN-A) 0.05 % external cream     Si g daily as needed     spironolactone (ALDACTONE) 100 MG tablet     Si mg daily     Candy was seen in clinic for routine annual exam. Feels well overall with active lifestyle and balanced diet.    - Unable to visualize IUD strings this visit. Follow-up with pelvic ultrasound. Counseled to use condoms with intercourse until ultrasound results.    - Preventative health labs ordered to be completed in the future when fasting including thyroid levels, lipids, CBC, vit D, and  glucose.  - Last pap NIL/HPV negative in 8/17/2017. Plan for next pap in 8/2022.  - Additional teaching done at this visit regarding calcium (1200 mg per day), self breast exam, birth control and pap-smear.    Return to clinic in one year.  Follow-up as needed.    I, ERIC Bermudez, am serving as a scribe; to document services personally performed by  Rasheeda Vazquez CNM based on data collection and the provider's statements to me.     ERIC Bermudez    I agree with the PFSH and ROS as completed by  ERIC Winn except for changes made by me. The remainder of the encounter was performed by me and scribed by ERIC Winn. The scribed note accurately reflects my personal services and decisions made by me.   MAREK Saravia CNM

## 2021-06-04 NOTE — PROGRESS NOTES
Answers for HPI/ROS submitted by the patient on 6/3/2021   JUAN 7 TOTAL SCORE: 2    Progress Note    SUBJECTIVE:  Candy Rodriguez is an 35 year old, , who requests an Annual Preventive Exam.     Concerns today include:  - pain below left nipple intermittently noticed for the past two months. Does not appear to be related to menstrual cycle.  - interested in IUD string check    Menstrual History:  Menstrual History 2016 11/3/2016 2016   LAST MENSTRUAL PERIOD 2016       Last    Lab Results   Component Value Date    PAP NIL 2017     History of abnormal Pap smear: NO - age 30-65 PAP every 5 years with negative HPV co-testing recommended    Last   Lab Results   Component Value Date    HPV16 Negative 2017     Last   Lab Results   Component Value Date    HPV18 Negative 2017     Last   Lab Results   Component Value Date    HRHPV Negative 2017       Mammogram current: not applicable  Last Mammogram:   No results found.     HISTORY:  spironolactone (ALDACTONE) 100 MG tablet, 100 mg daily  tretinoin (RETIN-A) 0.05 % external cream, 1 g daily as needed  levonorgestrel (MIRENA, 52 MG,) 20 MCG/24HR IUD, 1 each (20 mcg) by Intrauterine route once for 1 dose    No current facility-administered medications on file prior to visit.     Allergies   Allergen Reactions     Sulfa Drugs Other (See Comments)     family history has never personally tried     Penicillins Rash     as a baby.       Immunization History   Administered Date(s) Administered     COVID-19,PF,Moderna 04/10/2021, 2021     Flu, Unspecified 10/04/2018     HEPA 2012, 2014     HPV 2007, 10/08/2007, 2008     HPV Quadrivalent 2007, 10/08/2007, 2008     HepA-Adult 2012, 2014     HepB 1998, 2001, 2001     Influenza (H1N1) 2010     Influenza (IIV3) PF 11/15/2008, 2009, 10/01/2013     Influenza Vaccine IM > 6 months Valent IIV4  2013, 10/28/2014, 10/03/2015, 10/13/2016, 10/14/2017, 10/05/2019     Influenza,INJ,MDCK,PF,Quad >4yrs 2020     MMR 1985, 1996     Meningococcal (Menomune ) 2002     TD (ADULT, 7+) 1996, 2006     TDAP Vaccine (Adacel) 2011, 2017     TDAP Vaccine (Boostrix) 2014     Tdap (Adacel,Boostrix) 2011, 2014, 2017     Typhoid IM 2014     Varicella 2001, 2001       OB History    Para Term  AB Living   2 2 2 0 0 2   SAB TAB Ectopic Multiple Live Births   0 0 0 0 2     Past Medical History:   Diagnosis Date     Other acne      Past Surgical History:   Procedure Laterality Date     HC TOOTH EXTRACTION W/FORCEP       Family History   Problem Relation Age of Onset     Cerebrovascular Disease Maternal Grandmother      Alzheimer Disease Maternal Grandmother      Cerebrovascular Disease Maternal Grandfather      Cardiovascular Maternal Grandfather      Cancer Father         SCC and BCC, bladder     Blood Disease Sister         ITP     Social History     Socioeconomic History     Marital status:      Spouse name: Zacarias Rodriguez     Number of children: 0     Years of education: None     Highest education level: None   Occupational History     Occupation:      Employer: [employer]     Comment: [employer]   Social Needs     Financial resource strain: Not hard at all     Food insecurity     Worry: Never true     Inability: Never true     Transportation needs     Medical: No     Non-medical: No   Tobacco Use     Smoking status: Never Smoker     Smokeless tobacco: Never Used   Substance and Sexual Activity     Alcohol use: Yes     Alcohol/week: 0.0 standard drinks     Comment: 1-2 drinks/week     Drug use: No     Sexual activity: Yes     Partners: Male     Birth control/protection: I.U.D.   Lifestyle     Physical activity     Days per week: 6 days     Minutes per session: None     Stress: Only a little    Relationships     Social connections     Talks on phone: None     Gets together: None     Attends Sabianism service: None     Active member of club or organization: None     Attends meetings of clubs or organizations: None     Relationship status: None     Intimate partner violence     Fear of current or ex partner: None     Emotionally abused: None     Physically abused: None     Forced sexual activity: None   Other Topics Concern     Parent/sibling w/ CABG, MI or angioplasty before 65F 55M? No      Service No     Blood Transfusions No     Caffeine Concern Not Asked     Occupational Exposure Not Asked     Hobby Hazards Not Asked     Sleep Concern Not Asked     Stress Concern Not Asked     Weight Concern Not Asked     Special Diet Not Asked     Back Care Not Asked     Exercise Not Asked     Bike Helmet Not Asked     Seat Belt Yes     Self-Exams Not Asked   Social History Narrative    Caffeine intake/servings daily - 2 cups    Calcium intake/servings daily - 3 day    Exercise 3-4 times weekly - describe running and spin bike and dog walking.     Sunscreen used - Yes    Seatbelts used - Yes    Guns stored in the home - Yes and No    Self Breast Exam - Yes    Pap test up to date -  Yes    Eye exam up to date - No    Dental exam up to date - No    DEXA scan up to date -  Yes and Not Applicable    Flex Sig/Colonoscopy up to date -  Yes and Not Applicable    Mammography up to date -  Yes and Not Applicable    Immunizations reviewed and up to date - Yes    Abuse: Current or Past (Physical, Sexual or Emotional) - Yes and No    Do you feel safe in your environment - Yes    Do you cope well with stress - Yes    Do you suffer from insomnia - No    Last updated by: Deb Neville  11/3/2016        Reviewed [facility] 6-4-21               Review of Systems     Constitutional:  Negative for fever, chills, weight loss, weight gain, fatigue, decreased appetite, night sweats, recent stressors, height gain, height loss,  post-operative complications, incisional pain, hallucinations, increased energy, hyperactivity and confused.   HENT:  Negative for ear pain, hearing loss, tinnitus, nosebleeds, trouble swallowing, hoarse voice, mouth sores, sore throat, ear discharge, tooth pain, gum tenderness, taste disturbance, smell disturbance, hearing aid, bleeding gums, dry mouth, sinus pain, sinus congestion and neck mass.    Eyes:  Negative for double vision, pain, redness, eye pain, decreased vision, eye watering, eye bulging, eye dryness, flashing lights, spots, floaters, strabismus, tunnel vision, jaundice and eye irritation.   Respiratory:   Negative for cough, hemoptysis, sputum production, shortness of breath, wheezing, sleep disturbances due to breathing, snores loudly, respiratory pain, dyspnea on exertion, cough disturbing sleep and postural dyspnea.    Cardiovascular:  Negative for chest pain, dyspnea on exertion, palpitations, orthopnea, claudication, leg swelling, fingers/toes turn blue, hypertension, hypotension, syncope, history of heart murmur, chest pain on exertion, chest pain at rest, pacemaker, few scattered varicosities, leg pain, sleep disturbances due to breathing, tachycardia, light-headedness, exercise intolerance and edema.   Gastrointestinal:  Negative for heartburn, nausea, vomiting, abdominal pain, diarrhea, constipation, blood in stool, melena, rectal pain, bloating, hemorrhoids, bowel incontinence, jaundice, rectal bleeding, coffee ground emesis and change in stool.   Genitourinary:  Negative for bladder incontinence, dysuria, urgency, hematuria, flank pain, vaginal discharge, difficulty urinating, genital sores, dyspareunia, decreased libido, nocturia, voiding less frequently, arousal difficulty, abnormal vaginal bleeding, excessive menstruation, menstrual changes, hot flashes, vaginal dryness and postmenopausal bleeding.   Musculoskeletal:  Negative for myalgias, back pain, joint swelling, arthralgias,  "stiffness, muscle cramps, neck pain, bone pain, muscle weakness and fracture.   Skin:  Negative for nail changes, itching, poor wound healing, rash, hair changes, skin changes, acne, warts, poor wound healing, scarring, flaky skin, Raynaud's phenomenon, sensitivity to sunlight and skin thickening.   Neurological:  Negative for dizziness, tingling, tremors, speech change, seizures, loss of consciousness, weakness, light-headedness, numbness, headaches, disturbances in coordination, extremity numbness, memory loss, difficulty walking and paralysis.   Endo/Heme:  Negative for anemia, swollen glands and bruises/bleeds easily.   Psychiatric/Behavioral:  Negative for depression, hallucinations, memory loss, decreased concentration, mood swings and panic attacks.    Breast:  Positive for breast pain. Negative for breast discharge, breast mass and nipple retraction.   Endocrine:  Negative for altered temperature regulation, polyphagia, polydipsia, unwanted hair growth and change in facial hair.    [unfilled]  PHQ-9 SCORE 12/30/2014 12/26/2016 2/24/2017   PHQ-9 Total Score 0 - -   PHQ-9 Total Score - 0 0     JUAN-7 SCORE 6/3/2021   Total Score 2 (minimal anxiety)   Total Score 2     EXAM:  Blood pressure 121/85, pulse 73, height 1.664 m (5' 5.5\"), weight 64 kg (141 lb), currently breastfeeding. Body mass index is 23.11 kg/m .  General - pleasant female in no acute distress.  Skin - no suspicious lesions or rashes  EENT-  PERRLA, euthyroid with out palpable nodules  Neck - supple without lymphadenopathy.  Lungs - clear to auscultation bilaterally.  Heart - regular rate and rhythm without murmur.  Abdomen - soft, nontender, nondistended, no masses or organomegaly noted.  Musculoskeletal - no gross deformities.  Neurological - normal strength, sensation, and mental status.    Breast Exam:  Breast: Without visible skin changes. No dimpling or lesions seen.   Breasts supple, non-tender with palpation, no dominant mass, nodularity, or " nipple discharge noted bilaterally. Axillary nodes negative.      Pelvic Exam:  EG/BUS: Normal genital architecture without lesions, erythema or abnormal secretions. Normal genital architecture without lesions, erythema or abnormal secretions   Urethral meatus: normal   Vagina: moist, pink, rugae with creamy, white, odorless and blood tinged  secretions  Cervix: Multiparous, pink, moist, closed, without lesion or CMT and IUD strings not palpable or visible. Attempted to tease strings from cervix with cotton swab with no success.   Uterus: midposition, and small, smooth, firm, mobile w/o pain  Adnexa: Within normal limits and No masses, nodularity, tenderness  Rectum:anus normal     ASSESSMENT:  Encounter Diagnoses   Name Primary?     Routine general medical examination at a health care facility Yes     Intrauterine contraceptive device threads lost, initial encounter         PLAN:   Orders Placed This Encounter   Procedures     US Transvaginal Non OB     25- OH-Vitamin D     Glucose     TSH with free T4 reflex     Lipid Profile     CBC with Platelets     Orders Placed This Encounter   Medications     tretinoin (RETIN-A) 0.05 % external cream     Si g daily as needed     spironolactone (ALDACTONE) 100 MG tablet     Si mg daily     Candy was seen in clinic for routine annual exam. Feels well overall with active lifestyle and balanced diet.    - Unable to visualize IUD strings this visit. Follow-up with pelvic ultrasound. Counseled to use condoms with intercourse until ultrasound results.    - Preventative health labs ordered to be completed in the future when fasting including thyroid levels, lipids, CBC, vit D, and glucose.  - Last pap NIL/HPV negative in 2017. Plan for next pap in 2022.  - Additional teaching done at this visit regarding calcium (1200 mg per day), self breast exam, birth control and pap-smear.    Return to clinic in one year.  Follow-up as needed.    Dede KING, ERIC, am  serving as a scribe; to document services personally performed by  Rasheeda Vazquez CNM based on data collection and the provider's statements to me.     ERIC Bermudez    I agree with the PFSH and ROS as completed by  ERIC Winn except for changes made by me. The remainder of the encounter was performed by me and scribed by ERIC Winn. The scribed note accurately reflects my personal services and decisions made by me.   MAREK Saravia CNM

## 2021-06-11 ENCOUNTER — HOSPITAL ENCOUNTER (OUTPATIENT)
Dept: ULTRASOUND IMAGING | Facility: CLINIC | Age: 36
Discharge: HOME | End: 2021-06-11
Attending: ADVANCED PRACTICE MIDWIFE | Admitting: ADVANCED PRACTICE MIDWIFE
Payer: COMMERCIAL

## 2021-06-11 DIAGNOSIS — T83.32XA INTRAUTERINE CONTRACEPTIVE DEVICE THREADS LOST, INITIAL ENCOUNTER: ICD-10-CM

## 2021-06-11 PROCEDURE — 76830 TRANSVAGINAL US NON-OB: CPT

## 2021-06-11 PROCEDURE — 76830 TRANSVAGINAL US NON-OB: CPT | Mod: 26 | Performed by: RADIOLOGY

## 2021-06-11 PROCEDURE — 76856 US EXAM PELVIC COMPLETE: CPT | Mod: 26 | Performed by: RADIOLOGY

## 2021-06-27 ENCOUNTER — MYC MEDICAL ADVICE (OUTPATIENT)
Dept: OBGYN | Facility: CLINIC | Age: 36
End: 2021-06-27

## 2021-06-27 DIAGNOSIS — N64.4 PAIN OF LEFT BREAST: Primary | ICD-10-CM

## 2021-07-07 ENCOUNTER — HOSPITAL ENCOUNTER (OUTPATIENT)
Dept: MAMMOGRAPHY | Facility: CLINIC | Age: 36
End: 2021-07-07
Attending: ADVANCED PRACTICE MIDWIFE
Payer: COMMERCIAL

## 2021-07-07 DIAGNOSIS — N64.4 PAIN OF LEFT BREAST: ICD-10-CM

## 2021-07-07 PROCEDURE — 77062 BREAST TOMOSYNTHESIS BI: CPT

## 2021-07-07 PROCEDURE — 76642 ULTRASOUND BREAST LIMITED: CPT | Mod: LT

## 2021-10-16 ENCOUNTER — IMMUNIZATION (OUTPATIENT)
Dept: PEDIATRICS | Facility: CLINIC | Age: 36
End: 2021-10-16
Payer: COMMERCIAL

## 2021-10-16 PROCEDURE — 90471 IMMUNIZATION ADMIN: CPT

## 2021-10-16 PROCEDURE — 90686 IIV4 VACC NO PRSV 0.5 ML IM: CPT

## 2022-07-30 ENCOUNTER — HEALTH MAINTENANCE LETTER (OUTPATIENT)
Age: 37
End: 2022-07-30

## 2022-10-09 ENCOUNTER — HEALTH MAINTENANCE LETTER (OUTPATIENT)
Age: 37
End: 2022-10-09

## 2022-11-04 ENCOUNTER — IMMUNIZATION (OUTPATIENT)
Dept: PEDIATRICS | Facility: CLINIC | Age: 37
End: 2022-11-04
Payer: COMMERCIAL

## 2022-11-04 PROCEDURE — 90471 IMMUNIZATION ADMIN: CPT

## 2022-11-04 PROCEDURE — 90686 IIV4 VACC NO PRSV 0.5 ML IM: CPT

## 2022-11-15 ENCOUNTER — IMMUNIZATION (OUTPATIENT)
Dept: PEDIATRICS | Facility: CLINIC | Age: 37
End: 2022-11-15
Payer: COMMERCIAL

## 2022-11-15 PROCEDURE — 91312 COVID-19,PF,PFIZER BOOSTER BIVALENT: CPT

## 2022-11-15 PROCEDURE — 0124A COVID-19,PF,PFIZER BOOSTER BIVALENT: CPT

## 2023-07-29 ASSESSMENT — ENCOUNTER SYMPTOMS
HEADACHES: 0
CONSTIPATION: 0
SORE THROAT: 0
DIZZINESS: 0
DYSURIA: 0
MYALGIAS: 0
COUGH: 0
HEARTBURN: 0
NERVOUS/ANXIOUS: 0
HEMATOCHEZIA: 0
ABDOMINAL PAIN: 0
HEMATURIA: 0
PARESTHESIAS: 0
FREQUENCY: 0
EYE PAIN: 0
JOINT SWELLING: 0
BREAST MASS: 0
DIARRHEA: 0
WEAKNESS: 0
FEVER: 0
CHILLS: 0
SHORTNESS OF BREATH: 0
NAUSEA: 0
ARTHRALGIAS: 0
PALPITATIONS: 0

## 2023-08-04 ENCOUNTER — OFFICE VISIT (OUTPATIENT)
Dept: FAMILY MEDICINE | Facility: CLINIC | Age: 38
End: 2023-08-04
Payer: COMMERCIAL

## 2023-08-04 VITALS
HEIGHT: 65 IN | SYSTOLIC BLOOD PRESSURE: 108 MMHG | OXYGEN SATURATION: 99 % | RESPIRATION RATE: 19 BRPM | TEMPERATURE: 97.2 F | WEIGHT: 142 LBS | BODY MASS INDEX: 23.66 KG/M2 | HEART RATE: 71 BPM | DIASTOLIC BLOOD PRESSURE: 68 MMHG

## 2023-08-04 DIAGNOSIS — Z12.31 VISIT FOR SCREENING MAMMOGRAM: ICD-10-CM

## 2023-08-04 DIAGNOSIS — Z13.0 SCREENING, ANEMIA, DEFICIENCY, IRON: ICD-10-CM

## 2023-08-04 DIAGNOSIS — Z13.1 SCREENING FOR DIABETES MELLITUS: ICD-10-CM

## 2023-08-04 DIAGNOSIS — Z12.4 CERVICAL CANCER SCREENING: ICD-10-CM

## 2023-08-04 DIAGNOSIS — Z11.59 NEED FOR HEPATITIS C SCREENING TEST: ICD-10-CM

## 2023-08-04 DIAGNOSIS — Z13.6 CARDIOVASCULAR SCREENING; LDL GOAL LESS THAN 160: ICD-10-CM

## 2023-08-04 DIAGNOSIS — Z00.00 ROUTINE GENERAL MEDICAL EXAMINATION AT A HEALTH CARE FACILITY: Primary | ICD-10-CM

## 2023-08-04 LAB
ALBUMIN SERPL BCG-MCNC: 5 G/DL (ref 3.5–5.2)
ALP SERPL-CCNC: 47 U/L (ref 35–104)
ALT SERPL W P-5'-P-CCNC: 15 U/L (ref 0–50)
ANION GAP SERPL CALCULATED.3IONS-SCNC: 9 MMOL/L (ref 7–15)
AST SERPL W P-5'-P-CCNC: 27 U/L (ref 0–45)
BILIRUB SERPL-MCNC: 1.1 MG/DL
BUN SERPL-MCNC: 15.2 MG/DL (ref 6–20)
CALCIUM SERPL-MCNC: 10.3 MG/DL (ref 8.6–10)
CHLORIDE SERPL-SCNC: 99 MMOL/L (ref 98–107)
CHOLEST SERPL-MCNC: 185 MG/DL
CREAT SERPL-MCNC: 0.94 MG/DL (ref 0.51–0.95)
DEPRECATED HCO3 PLAS-SCNC: 29 MMOL/L (ref 22–29)
GFR SERPL CREATININE-BSD FRML MDRD: 79 ML/MIN/1.73M2
GLUCOSE SERPL-MCNC: 72 MG/DL (ref 70–99)
HCV AB SERPL QL IA: NONREACTIVE
HDLC SERPL-MCNC: 73 MG/DL
HGB BLD-MCNC: 14.6 G/DL (ref 11.7–15.7)
LDLC SERPL CALC-MCNC: 97 MG/DL
NONHDLC SERPL-MCNC: 112 MG/DL
POTASSIUM SERPL-SCNC: 4.1 MMOL/L (ref 3.4–5.3)
PROT SERPL-MCNC: 7.5 G/DL (ref 6.4–8.3)
SODIUM SERPL-SCNC: 137 MMOL/L (ref 136–145)
TRIGL SERPL-MCNC: 75 MG/DL

## 2023-08-04 PROCEDURE — 80053 COMPREHEN METABOLIC PANEL: CPT | Performed by: PHYSICIAN ASSISTANT

## 2023-08-04 PROCEDURE — 87624 HPV HI-RISK TYP POOLED RSLT: CPT | Performed by: PHYSICIAN ASSISTANT

## 2023-08-04 PROCEDURE — 86803 HEPATITIS C AB TEST: CPT | Performed by: PHYSICIAN ASSISTANT

## 2023-08-04 PROCEDURE — 99385 PREV VISIT NEW AGE 18-39: CPT | Performed by: PHYSICIAN ASSISTANT

## 2023-08-04 PROCEDURE — G0145 SCR C/V CYTO,THINLAYER,RESCR: HCPCS | Performed by: PHYSICIAN ASSISTANT

## 2023-08-04 PROCEDURE — 85018 HEMOGLOBIN: CPT | Performed by: PHYSICIAN ASSISTANT

## 2023-08-04 PROCEDURE — 36415 COLL VENOUS BLD VENIPUNCTURE: CPT | Performed by: PHYSICIAN ASSISTANT

## 2023-08-04 PROCEDURE — 80061 LIPID PANEL: CPT | Performed by: PHYSICIAN ASSISTANT

## 2023-08-04 ASSESSMENT — ENCOUNTER SYMPTOMS
JOINT SWELLING: 0
SHORTNESS OF BREATH: 0
FREQUENCY: 0
HEMATURIA: 0
PALPITATIONS: 0
DYSURIA: 0
HEARTBURN: 0
WEAKNESS: 0
NERVOUS/ANXIOUS: 0
NAUSEA: 0
BREAST MASS: 0
CONSTIPATION: 0
MYALGIAS: 0
PARESTHESIAS: 0
DIARRHEA: 0
ARTHRALGIAS: 0
ABDOMINAL PAIN: 0
EYE PAIN: 0
SORE THROAT: 0
DIZZINESS: 0
HEADACHES: 0
HEMATOCHEZIA: 0
FEVER: 0
CHILLS: 0
COUGH: 0

## 2023-08-04 ASSESSMENT — PAIN SCALES - GENERAL: PAINLEVEL: NO PAIN (0)

## 2023-08-04 NOTE — RESULT ENCOUNTER NOTE
"Rick Gupta  Your attached labs are normal. Keep up the good work!  Please contact the office with any questions or concerns.    Brenda Jain \"Surjit\" NEISHA Sanders    "

## 2023-08-04 NOTE — PROGRESS NOTES
SUBJECTIVE:   CC: Candy is an 37 year old who presents for preventive health visit.       8/4/2023     8:55 AM   Additional Questions   Roomed by dada ruby   Accompanied by n/a         8/4/2023     8:55 AM   Patient Reported Additional Medications   Patient reports taking the following new medications n/a       Healthy Habits:     Getting at least 3 servings of Calcium per day:  Yes    Bi-annual eye exam:  NO    Dental care twice a year:  Yes    Sleep apnea or symptoms of sleep apnea:  None    Diet:  Regular (no restrictions)    Frequency of exercise:  6-7 days/week    Duration of exercise:  30-45 minutes    Taking medications regularly:  Yes    Medication side effects:  None    Additional concerns today:  No      Today's PHQ-2 Score:       8/3/2023     4:44 PM   PHQ-2 ( 1999 [employer])   Q1: Little interest or pleasure in doing things 0   Q2: Feeling down, depressed or hopeless 0   PHQ-2 Score 0   Q1: Little interest or pleasure in doing things Not at all   Q2: Feeling down, depressed or hopeless Not at all   PHQ-2 Score 0       Have you ever done Advance Care Planning? (For example, a Health Directive, POLST, or a discussion with a medical provider or your loved ones about your wishes): No, advance care planning information given to patient to review.  Advanced care planning was discussed at today's visit.    Social History     Tobacco Use    Smoking status: Never    Smokeless tobacco: Never   Substance Use Topics    Alcohol use: Yes     Comment: 1-3 drinks/week             7/29/2023     4:48 PM   Alcohol Use   Prescreen: >3 drinks/day or >7 drinks/week? No     Reviewed orders with patient.  Reviewed health maintenance and updated orders accordingly - Yes  Labs reviewed in EPIC  BP Readings from Last 3 Encounters:   08/04/23 108/68   06/04/21 121/85   05/22/18 130/79    Wt Readings from Last 3 Encounters:   08/04/23 64.4 kg (142 lb)   06/04/21 64 kg (141 lb)   05/22/18 64.9 kg (143 lb)                  Patient  Active Problem List   Diagnosis    CARDIOVASCULAR SCREENING; LDL GOAL LESS THAN 160    Facial rhytids    Multiple pigmented nevi    IUD (intrauterine device) in place     Past Surgical History:   Procedure Laterality Date    HC TOOTH EXTRACTION W/FORCEP         Social History     Tobacco Use    Smoking status: Never    Smokeless tobacco: Never   Substance Use Topics    Alcohol use: Yes     Comment: 1-3 drinks/week     Family History   Problem Relation Age of Onset    Cerebrovascular Disease Maternal Grandmother     Alzheimer Disease Maternal Grandmother     Cerebrovascular Disease Maternal Grandfather     Cardiovascular Maternal Grandfather     Cancer Father         SCC and BCC, bladder    Blood Disease Sister         ITP         Current Outpatient Medications   Medication Sig Dispense Refill    spironolactone (ALDACTONE) 100 MG tablet 100 mg daily      tretinoin (RETIN-A) 0.05 % external cream 1 g daily as needed      levonorgestrel (MIRENA, 52 MG,) 20 MCG/24HR IUD 1 each (20 mcg) by Intrauterine route once for 1 dose 1 each 0     Allergies   Allergen Reactions    Sulfa Antibiotics Other (See Comments)     family history has never personally tried    Penicillins Rash     as a baby.       No lab results found.     Breast Cancer Screenin/29/2023     4:49 PM   Breast CA Risk Assessment (FHS-7)   Do you have a family history of breast, colon, or ovarian cancer? No / Unknown       Patient under 40 years of age: Routine Mammogram Screening not recommended.   Pertinent mammograms are reviewed under the imaging tab.    History of abnormal Pap smear: NO - age 30- 65 PAP every 3 years recommended      Latest Ref Rng & Units 2017     1:25 PM 2017    12:37 PM 2014    12:00 AM   PAP / HPV   PAP (Historical)   NIL  NIL    HPV 16 DNA NEG^Negative Negative      HPV 18 DNA NEG^Negative Negative      Other HR HPV NEG^Negative Negative        Reviewed and updated as needed this visit by clinical staff    "Tobacco  Allergies  Meds  Problems  Med Hx  Surg Hx  Fam Hx          Reviewed and updated as needed this visit by Provider   Tobacco  Allergies  Meds  Problems  Med Hx  Surg Hx  Fam Hx           Past Medical History:   Diagnosis Date    Other acne       Past Surgical History:   Procedure Laterality Date    HC TOOTH EXTRACTION W/FORCEP  2001       Review of Systems   Constitutional:  Negative for chills and fever.   HENT:  Negative for congestion, ear pain, hearing loss and sore throat.    Eyes:  Negative for pain and visual disturbance.   Respiratory:  Negative for cough and shortness of breath.    Cardiovascular:  Negative for chest pain, palpitations and peripheral edema.   Gastrointestinal:  Negative for abdominal pain, constipation, diarrhea, heartburn, hematochezia and nausea.   Breasts:  Negative for tenderness, breast mass and discharge.   Genitourinary:  Negative for dysuria, frequency, genital sores, hematuria, pelvic pain, urgency, vaginal bleeding and vaginal discharge.   Musculoskeletal:  Negative for arthralgias, joint swelling and myalgias.   Skin:  Negative for rash.   Neurological:  Negative for dizziness, weakness, headaches and paresthesias.   Psychiatric/Behavioral:  Negative for mood changes. The patient is not nervous/anxious.         OBJECTIVE:   /68 (BP Location: Left arm, Patient Position: Sitting, Cuff Size: Adult Regular)   Pulse 71   Temp 97.2  F (36.2  C) (Temporal)   Resp 19   Ht 1.66 m (5' 5.35\")   Wt 64.4 kg (142 lb)   SpO2 99%   Breastfeeding No   BMI 23.37 kg/m    Physical Exam  GENERAL: healthy, alert and no distress  EYES: Eyes grossly normal to inspection, PERRL and conjunctivae and sclerae normal  HENT: ear canals and TM's normal, nose and mouth without ulcers or lesions  NECK: no adenopathy, no asymmetry, masses, or scars and thyroid normal to palpation  RESP: lungs clear to auscultation - no rales, rhonchi or wheezes  BREAST: normal without masses, " tenderness or nipple discharge and no palpable axillary masses or adenopathy  CV: regular rate and rhythm, normal S1 S2, no S3 or S4, no murmur, click or rub, no peripheral edema and peripheral pulses strong  ABDOMEN: soft, nontender, no hepatosplenomegaly, no masses and bowel sounds normal   (female): normal female external genitalia, normal urethral meatus, vaginal mucosa pink, moist, well rugated, and normal cervix/adnexa/uterus without masses or discharge; pap smear done today  MS: no gross musculoskeletal defects noted, no edema  SKIN: no suspicious lesions or rashes  NEURO: Normal strength and tone, mentation intact and speech normal  PSYCH: mentation appears normal, affect normal/bright    Diagnostic Test Results:  Labs reviewed in Epic    ASSESSMENT/PLAN:       ICD-10-CM    1. Routine general medical examination at a health care facility  Z00.00       2. Cervical cancer screening  Z12.4 Pap Screen with HPV - recommended age 30 - 65 years      3. Need for hepatitis C screening test  Z11.59 Hepatitis C Screen Reflex to HCV RNA Quant and Genotype      4. Screening, anemia, deficiency, iron  Z13.0 Hemoglobin      5. Screening for diabetes mellitus  Z13.1 Comprehensive metabolic panel      6. CARDIOVASCULAR SCREENING; LDL GOAL LESS THAN 160  Z13.6 Lipid panel reflex to direct LDL Fasting      7. Visit for screening mammogram  Z12.31 *MA Screening Digital Bilateral          Patient has been advised of split billing requirements and indicates understanding: Yes      COUNSELING:  Reviewed preventive health counseling, as reflected in patient instructions       Regular exercise       Healthy diet/nutrition        She reports that she has never smoked. She has never used smokeless tobacco.          Brenda Sanders PA-C  [facility]

## 2023-08-08 LAB
BKR LAB AP GYN ADEQUACY: NORMAL
BKR LAB AP GYN INTERPRETATION: NORMAL
BKR LAB AP HPV REFLEX: NORMAL
BKR LAB AP PREVIOUS ABNORMAL: NORMAL
PATH REPORT.COMMENTS IMP SPEC: NORMAL
PATH REPORT.COMMENTS IMP SPEC: NORMAL
PATH REPORT.RELEVANT HX SPEC: NORMAL

## 2023-08-10 LAB
HUMAN PAPILLOMA VIRUS 16 DNA: NEGATIVE
HUMAN PAPILLOMA VIRUS 18 DNA: NEGATIVE
HUMAN PAPILLOMA VIRUS FINAL DIAGNOSIS: NORMAL
HUMAN PAPILLOMA VIRUS OTHER HR: NEGATIVE

## 2023-09-16 ENCOUNTER — IMMUNIZATION (OUTPATIENT)
Dept: PEDIATRICS | Facility: CLINIC | Age: 38
End: 2023-09-16
Payer: COMMERCIAL

## 2023-09-16 PROCEDURE — 90686 IIV4 VACC NO PRSV 0.5 ML IM: CPT

## 2023-09-16 PROCEDURE — 90471 IMMUNIZATION ADMIN: CPT

## 2024-03-16 ENCOUNTER — HEALTH MAINTENANCE LETTER (OUTPATIENT)
Age: 39
End: 2024-03-16

## 2024-03-22 ENCOUNTER — ANCILLARY PROCEDURE (OUTPATIENT)
Dept: MAMMOGRAPHY | Facility: CLINIC | Age: 39
End: 2024-03-22
Attending: PHYSICIAN ASSISTANT
Payer: COMMERCIAL

## 2024-03-22 DIAGNOSIS — Z12.31 VISIT FOR SCREENING MAMMOGRAM: ICD-10-CM

## 2024-03-22 PROCEDURE — 77067 SCR MAMMO BI INCL CAD: CPT | Mod: TC | Performed by: RADIOLOGY

## 2024-03-22 PROCEDURE — 77063 BREAST TOMOSYNTHESIS BI: CPT | Mod: TC | Performed by: RADIOLOGY

## 2024-05-02 ENCOUNTER — VIRTUAL VISIT (OUTPATIENT)
Dept: FAMILY MEDICINE | Facility: CLINIC | Age: 39
End: 2024-05-02
Payer: COMMERCIAL

## 2024-05-02 DIAGNOSIS — L70.0 ACNE VULGARIS: Primary | ICD-10-CM

## 2024-05-02 PROCEDURE — 99213 OFFICE O/P EST LOW 20 MIN: CPT | Mod: 95 | Performed by: PHYSICIAN ASSISTANT

## 2024-05-02 RX ORDER — SPIRONOLACTONE 100 MG/1
100 TABLET, FILM COATED ORAL DAILY
Qty: 90 TABLET | Refills: 1 | Status: SHIPPED | OUTPATIENT
Start: 2024-05-02

## 2024-05-02 ASSESSMENT — PAIN SCALES - PAIN ENJOYMENT GENERAL ACTIVITY SCALE (PEG)
AVG_PAIN_PASTWEEK: 1
INTERFERED_ENJOYMENT_LIFE: 0 - DOES NOT INTERFERE
AVG_PAIN_PASTWEEK: 1
INTERFERED_GENERAL_ACTIVITY: 0 - DOES NOT INTERFERE
INTERFERED_GENERAL_ACTIVITY: 0
INTERFERED_ENJOYMENT_LIFE: 0
PEG_TOTALSCORE: .33
PEG_TOTALSCORE: 0.33

## 2024-05-02 NOTE — PATIENT INSTRUCTIONS
Acid Suppression Treatment    Trial of over the counter pepcid initially and then see below.    Start omeprazole 20mg tab, take 2 tabs 30 minutes before breakfast until you symptoms resolve OR you have taken the medication for 2 months  Then decrease to 1 tab in AM for 1 week, then 1 tablet every other day for another week  If you wish, you can also add pepcid at bedtime if you have having symptoms at night  If symptoms have not resolved, then please follow up with gastroenterology, referral placed today.    Lifestyle changes:    Avoid eating 2-3 hours before bedtime.   You may find it helpful to elevate the head of your bed.     Avoid following foods that are likely to trigger acid reflux:    Coffee or tea   Anything that s fizzy or has caffeine in it  Alcohol   Citrus fruits, such as oranges and tyrel  Tomato based foods (salsa, pizza, lasanga)  Chocolate   Mint or peppermint  Fatty foods (ice cream)  Spicy foods  Onions and garlic

## 2024-05-02 NOTE — PROGRESS NOTES
Candy is a 38 year old who is being evaluated via a billable video visit.    How would you like to obtain your AVS? MyChart  If the video visit is dropped, the invitation should be resent by: Send to e-mail at: [email or web address]  Will anyone else be joining your video visit? No      Assessment & Plan     Acne vulgaris  Prescription refills sent to pharmacy and most likely is related to recent symptoms/side effects; continue to monitor with over the counter and supportive care discussed with patient and in patient instructions; Return to clinic with any worsening or changes in symptoms and follow up for routine care.   - spironolactone (ALDACTONE) 100 MG tablet; Take 1 tablet (100 mg) by mouth daily    Review of prior external note(s) from - previous routine notes  20 minutes spent by me on the date of the encounter doing chart review, history and exam, documentation and further activities per the note        See Patient Instructions    Subjective   Candy is a 38 year old, presenting for the following health issues:  Breast Problem        5/2/2024    11:31 AM   Additional Questions   Roomed by Junior DONALDSON     History of Present Illness       Reason for visit:  Soreness/burning sensation in left breast.  Symptom onset:  More than a month  Symptoms include:  Burning sensation in left breast in upper right area.  Symptom intensity:  Mild  Symptom progression:  Staying the same  Had these symptoms before:  No  What makes it worse:  No  What makes it better:  No    She eats 2-3 servings of fruits and vegetables daily.She consumes 1 sweetened beverage(s) daily.She exercises with enough effort to increase her heart rate 30 to 60 minutes per day.  She exercises with enough effort to increase her heart rate 7 days per week.   She is taking medications regularly.    Patient has been having symptoms leading up to the mammogram and had a work up for similar symptoms previously as well around 2021.  Feels chest/muscular, not sure if  directly breast related.  Patient has routine mammogram 3/22/24 - within normal limits.  Patient taking spironolactone 100 mg daily - previously reduced to 50 mg around the time and then increased again back to 100 mg.    Review of Systems  Constitutional, HEENT, cardiovascular, pulmonary, gi and gu systems are negative, except as otherwise noted.      Objective    Vitals - Patient Reported  Pain Score: No Pain (0)      Vitals:  No vitals were obtained today due to virtual visit.    Physical Exam   GENERAL: alert and no distress  EYES: Eyes grossly normal to inspection.  No discharge or erythema, or obvious scleral/conjunctival abnormalities.  RESP: No audible wheeze, cough, or visible cyanosis.    SKIN: Visible skin clear. No significant rash, abnormal pigmentation or lesions.  NEURO: Cranial nerves grossly intact.  Mentation and speech appropriate for age.  PSYCH: Appropriate affect, tone, and pace of words          Video-Visit Details    Type of service:  Video Visit   Originating Location (pt. Location): Home    Distant Location (provider location):  Off-site  Platform used for Video Visit: Brittany  Signed Electronically by: Surjit Sanders PA-C

## 2024-07-05 ENCOUNTER — PATIENT OUTREACH (OUTPATIENT)
Dept: CARE COORDINATION | Facility: CLINIC | Age: 39
End: 2024-07-05
Payer: COMMERCIAL

## 2024-07-19 ENCOUNTER — PATIENT OUTREACH (OUTPATIENT)
Dept: CARE COORDINATION | Facility: CLINIC | Age: 39
End: 2024-07-19
Payer: COMMERCIAL

## 2024-10-08 ENCOUNTER — OFFICE VISIT (OUTPATIENT)
Dept: FAMILY MEDICINE | Facility: CLINIC | Age: 39
End: 2024-10-08
Payer: COMMERCIAL

## 2024-10-08 VITALS
OXYGEN SATURATION: 100 % | DIASTOLIC BLOOD PRESSURE: 75 MMHG | RESPIRATION RATE: 18 BRPM | HEIGHT: 65 IN | HEART RATE: 53 BPM | SYSTOLIC BLOOD PRESSURE: 121 MMHG | BODY MASS INDEX: 25.11 KG/M2 | TEMPERATURE: 98.1 F | WEIGHT: 150.7 LBS

## 2024-10-08 DIAGNOSIS — L70.0 ACNE VULGARIS: ICD-10-CM

## 2024-10-08 DIAGNOSIS — Z00.00 ROUTINE GENERAL MEDICAL EXAMINATION AT A HEALTH CARE FACILITY: Primary | ICD-10-CM

## 2024-10-08 PROCEDURE — 99396 PREV VISIT EST AGE 40-64: CPT | Performed by: PHYSICIAN ASSISTANT

## 2024-10-08 PROCEDURE — 80053 COMPREHEN METABOLIC PANEL: CPT | Performed by: PHYSICIAN ASSISTANT

## 2024-10-08 PROCEDURE — 36415 COLL VENOUS BLD VENIPUNCTURE: CPT | Performed by: PHYSICIAN ASSISTANT

## 2024-10-08 RX ORDER — SPIRONOLACTONE 100 MG/1
100 TABLET, FILM COATED ORAL DAILY
Qty: 90 TABLET | Refills: 1 | Status: SHIPPED | OUTPATIENT
Start: 2024-10-08

## 2024-10-08 SDOH — HEALTH STABILITY: PHYSICAL HEALTH: ON AVERAGE, HOW MANY DAYS PER WEEK DO YOU ENGAGE IN MODERATE TO STRENUOUS EXERCISE (LIKE A BRISK WALK)?: 6 DAYS

## 2024-10-08 ASSESSMENT — ANXIETY QUESTIONNAIRES
GAD7 TOTAL SCORE: 0
4. TROUBLE RELAXING: NOT AT ALL
5. BEING SO RESTLESS THAT IT IS HARD TO SIT STILL: NOT AT ALL
GAD7 TOTAL SCORE: 0
3. WORRYING TOO MUCH ABOUT DIFFERENT THINGS: NOT AT ALL
6. BECOMING EASILY ANNOYED OR IRRITABLE: NOT AT ALL
7. FEELING AFRAID AS IF SOMETHING AWFUL MIGHT HAPPEN: NOT AT ALL
7. FEELING AFRAID AS IF SOMETHING AWFUL MIGHT HAPPEN: NOT AT ALL
2. NOT BEING ABLE TO STOP OR CONTROL WORRYING: NOT AT ALL
1. FEELING NERVOUS, ANXIOUS, OR ON EDGE: NOT AT ALL
GAD7 TOTAL SCORE: 0

## 2024-10-08 ASSESSMENT — PATIENT HEALTH QUESTIONNAIRE - PHQ9
SUM OF ALL RESPONSES TO PHQ QUESTIONS 1-9: 0
SUM OF ALL RESPONSES TO PHQ QUESTIONS 1-9: 0

## 2024-10-08 ASSESSMENT — PAIN SCALES - GENERAL: PAINLEVEL: NO PAIN (0)

## 2024-10-08 ASSESSMENT — SOCIAL DETERMINANTS OF HEALTH (SDOH): HOW OFTEN DO YOU GET TOGETHER WITH FRIENDS OR RELATIVES?: ONCE A WEEK

## 2024-10-08 NOTE — PATIENT INSTRUCTIONS
Patient Education   Preventive Care Advice   This is general advice given by our system to help you stay healthy. However, your care team may have specific advice just for you. Please talk to your care team about your preventive care needs.  Nutrition  Eat 5 or more servings of fruits and vegetables each day.  Try wheat bread, brown rice and whole grain pasta (instead of white bread, rice, and pasta).  Get enough calcium and vitamin D. Check the label on foods and aim for 100% of the RDA (recommended daily allowance).  Lifestyle  Exercise at least 150 minutes each week  (30 minutes a day, 5 days a week).  Do muscle strengthening activities 2 days a week. These help control your weight and prevent disease.  No smoking.  Wear sunscreen to prevent skin cancer.  Have a dental exam and cleaning every 6 months.  Yearly exams  See your health care team every year to talk about:  Any changes in your health.  Any medicines your care team has prescribed.  Preventive care, family planning, and ways to prevent chronic diseases.  Shots (vaccines)   HPV shots (up to age 26), if you've never had them before.  Hepatitis B shots (up to age 59), if you've never had them before.  COVID-19 shot: Get this shot when it's due.  Flu shot: Get a flu shot every year.  Tetanus shot: Get a tetanus shot every 10 years.  Pneumococcal, hepatitis A, and RSV shots: Ask your care team if you need these based on your risk.  Shingles shot (for age 50 and up)  General health tests  Diabetes screening:  Starting at age 35, Get screened for diabetes at least every 3 years.  If you are younger than age 35, ask your care team if you should be screened for diabetes.  Cholesterol test: At age 39, start having a cholesterol test every 5 years, or more often if advised.  Bone density scan (DEXA): At age 50, ask your care team if you should have this scan for osteoporosis (brittle bones).  Hepatitis C: Get tested at least once in your life.  STIs (sexually  transmitted infections)  Before age 24: Ask your care team if you should be screened for STIs.  After age 24: Get screened for STIs if you're at risk. You are at risk for STIs (including HIV) if:  You are sexually active with more than one person.  You don't use condoms every time.  You or a partner was diagnosed with a sexually transmitted infection.  If you are at risk for HIV, ask about PrEP medicine to prevent HIV.  Get tested for HIV at least once in your life, whether you are at risk for HIV or not.  Cancer screening tests  Cervical cancer screening: If you have a cervix, begin getting regular cervical cancer screening tests starting at age 21.  Breast cancer scan (mammogram): If you've ever had breasts, begin having regular mammograms starting at age 40. This is a scan to check for breast cancer.  Colon cancer screening: It is important to start screening for colon cancer at age 45.  Have a colonoscopy test every 10 years (or more often if you're at risk) Or, ask your provider about stool tests like a FIT test every year or Cologuard test every 3 years.  To learn more about your testing options, visit:   .  For help making a decision, visit:   https://bit.ly/bu95722.  Prostate cancer screening test: If you have a prostate, ask your care team if a prostate cancer screening test (PSA) at age 55 is right for you.  Lung cancer screening: If you are a current or former smoker ages 50 to 80, ask your care team if ongoing lung cancer screenings are right for you.  For informational purposes only. Not to replace the advice of your health care provider. Copyright   2023 [employer]. All rights reserved. Clinically reviewed by the [facility] Transitions Program. [employer] 530756 - REV 01/24.

## 2024-10-08 NOTE — PROGRESS NOTES
"Preventive Care Visit  [facility] PA-C, [facility] Medicine  Oct 8, 2024      Assessment & Plan   Problem List Items Addressed This Visit    None  Visit Diagnoses       Routine general medical examination at a health care facility    -  Primary    Acne vulgaris        Relevant Medications    spironolactone (ALDACTONE) 100 MG tablet    Other Relevant Orders    Comprehensive metabolic panel             Patient has been advised of split billing requirements and indicates understanding: Yes       BMI  Estimated body mass index is 25.08 kg/m  as calculated from the following:    Height as of this encounter: 1.651 m (5' 5\").    Weight as of this encounter: 68.4 kg (150 lb 11.2 oz).       Counseling  Appropriate preventive services were addressed with this patient via screening, questionnaire, or discussion as appropriate for fall prevention, nutrition, physical activity, Tobacco-use cessation, social engagement, weight loss and cognition.  Checklist reviewing preventive services available has been given to the patient.  Reviewed patient's diet, addressing concerns and/or questions.   She is at risk for psychosocial distress and has been provided with information to reduce risk.     See Patient Instructions      Edilma Gupta is a 38 year old, presenting for the following:  Physical        10/8/2024    11:26 AM   Additional Questions   Roomed by Trang DOOLEY        Health Care Directive  Patient does not have a Health Care Directive or Living Will: Discussed advance care planning with patient; information given to patient to review.    HPI        10/8/2024   General Health   How would you rate your overall physical health? Excellent   Feel stress (tense, anxious, or unable to sleep) Only a little      (!) STRESS CONCERN      10/8/2024   Nutrition   Three or more servings of calcium each day? Yes   Diet: Regular (no restrictions)   How many servings of fruit and vegetables per day? (!) 2-3 "   How many sweetened beverages each day? 0-1            10/8/2024   Exercise   Days per week of moderate/strenous exercise 6 days            10/8/2024   Social Factors   Frequency of gathering with friends or relatives Once a week   Worry food won't last until get money to buy more No   Food not last or not have enough money for food? No   Do you have housing? (Housing is defined as stable permanent housing and does not include staying ouside in a car, in a tent, in an abandoned building, in an overnight shelter, or couch-surfing.) Yes   Are you worried about losing your housing? No   Lack of transportation? No   Unable to get utilities (heat,electricity)? No            10/8/2024   Dental   Dentist two times every year? Yes            10/8/2024   TB Screening   Were you born outside of the [place]? No          Today's PHQ-9 Score:       10/8/2024    11:17 AM   PHQ-9 SCORE   PHQ-9 Total Score MyChart 0   PHQ-9 Total Score 0         10/8/2024   Substance Use   Alcohol more than 3/day or more than 7/wk No   Do you use any other substances recreationally? No        Social History     Tobacco Use    Smoking status: Never    Smokeless tobacco: Never   Vaping Use    Vaping status: Never Used   Substance Use Topics    Alcohol use: Yes     Comment: 1-3 drinks/week    Drug use: No           3/22/2024   LAST FHS-7 RESULTS   1st degree relative breast or ovarian cancer No   Any relative bilateral breast cancer No   Any male have breast cancer No   Any ONE woman have BOTH breast AND ovarian cancer No   Any woman with breast cancer before 50yrs No   2 or more relatives with breast AND/OR ovarian cancer No   2 or more relatives with breast AND/OR bowel cancer No           Mammogram Screening - Mammogram every 1-2 years updated in Health Maintenance based on mutual decision making        10/8/2024   STI Screening   New sexual partner(s) since last STI/HIV test? No        History of abnormal Pap smear: No - age 30- 64 PAP with HPV every  5 years recommended        Latest Ref Rng & Units 2023     9:13 AM 2017     1:25 PM 2017    12:37 PM   PAP / HPV   PAP  Negative for Intraepithelial Lesion or Malignancy (NILM)      PAP (Historical)    NIL    HPV 16 DNA Negative Negative  Negative     HPV 18 DNA Negative Negative  Negative     Other HR HPV Negative Negative  Negative       ASCVD Risk   The 10-year ASCVD risk score (Jimbo CABRAL, et al., 2019) is: 0.3%    Values used to calculate the score:      Age: 38 years      Sex: Female      Is Non- : No      Diabetic: No      Tobacco smoker: No      Systolic Blood Pressure: 121 mmHg      Is BP treated: No      HDL Cholesterol: 73 mg/dL      Total Cholesterol: 185 mg/dL        10/8/2024   Contraception/Family Planning   Questions about contraception or family planning (!) YES             Reviewed and updated as needed this visit by Provider   Tobacco  Allergies  Meds  Problems  Med Hx  Surg Hx  Fam Hx            Past Medical History:   Diagnosis Date    Other acne      Past Surgical History:   Procedure Laterality Date    HC TOOTH EXTRACTION W/FORCEP       OB History    Para Term  AB Living   2 2 2 0 0 2   SAB IAB Ectopic Multiple Live Births   0 0 0 0 2      # Outcome Date GA Lbr Jhonatan/2nd Weight Sex Type Anes PTL Lv   2 Term 17 40w5d 07:26 / 02:24 4.01 kg (8 lb 13.5 oz) F Vag-Spont EPI N NIKITA      Complications: GBS      Name: CORBIN FAN LIAM      Apgar1: 9  Apgar5: 9   1 Term 14 39w3d 03:51 / 02:11 3.62 kg (7 lb 15.7 oz) M Vag-Spont EPI, Local N NIKITA      Name: Porfirio      Apgar1: 9  Apgar5: 9     Labs reviewed in EPIC  BP Readings from Last 3 Encounters:   10/08/24 121/75   23 108/68   21 121/85    Wt Readings from Last 3 Encounters:   10/08/24 68.4 kg (150 lb 11.2 oz)   23 64.4 kg (142 lb)   21 64 kg (141 lb)                  Patient Active Problem List   Diagnosis    CARDIOVASCULAR SCREENING; LDL GOAL LESS  "THAN 160    Facial rhytids    Multiple pigmented nevi    IUD (intrauterine device) in place     Past Surgical History:   Procedure Laterality Date    HC TOOTH EXTRACTION W/FORCEP  2001       Social History     Tobacco Use    Smoking status: Never    Smokeless tobacco: Never   Substance Use Topics    Alcohol use: Yes     Comment: 1-3 drinks/week     Family History   Problem Relation Age of Onset    Cerebrovascular Disease Maternal Grandmother     Alzheimer Disease Maternal Grandmother     Cerebrovascular Disease Maternal Grandfather     Cardiovascular Maternal Grandfather     Cancer Father         SCC and BCC, bladder    Blood Disease Sister         ITP         Current Outpatient Medications   Medication Sig Dispense Refill    spironolactone (ALDACTONE) 100 MG tablet Take 1 tablet (100 mg) by mouth daily. 90 tablet 1    tretinoin (RETIN-A) 0.05 % external cream 1 g daily as needed      levonorgestrel (MIRENA, 52 MG,) 20 MCG/24HR IUD 1 each (20 mcg) by Intrauterine route once for 1 dose 1 each 0     Allergies   Allergen Reactions    Sulfa Antibiotics Other (See Comments)     family history has never personally tried    Penicillins Rash     as a baby.       Recent Labs   Lab Test 08/04/23  0929   LDL 97   HDL 73   TRIG 75   ALT 15   CR 0.94   GFRESTIMATED 79   POTASSIUM 4.1          Review of Systems  Constitutional, neuro, ENT, endocrine, pulmonary, cardiac, gastrointestinal, genitourinary, musculoskeletal, integument and psychiatric systems are negative, except as otherwise noted.     Objective    Exam  /75   Pulse 53   Temp 98.1  F (36.7  C) (Temporal)   Resp 18   Ht 1.651 m (5' 5\")   Wt 68.4 kg (150 lb 11.2 oz)   LMP  (LMP Unknown)   SpO2 100%   Breastfeeding No   BMI 25.08 kg/m     Estimated body mass index is 25.08 kg/m  as calculated from the following:    Height as of this encounter: 1.651 m (5' 5\").    Weight as of this encounter: 68.4 kg (150 lb 11.2 oz).    Physical Exam  GENERAL: alert and no " distress  EYES: Eyes grossly normal to inspection, PERRL and conjunctivae and sclerae normal  HENT: ear canals and TM's normal, nose and mouth without ulcers or lesions  NECK: no adenopathy, no asymmetry, masses, or scars  RESP: lungs clear to auscultation - no rales, rhonchi or wheezes  CV: regular rate and rhythm, normal S1 S2, no S3 or S4, no murmur, click or rub, no peripheral edema  MS: no gross musculoskeletal defects noted, no edema  SKIN: no suspicious lesions or rashes  NEURO: Normal strength and tone, mentation intact and speech normal  PSYCH: mentation appears normal, affect normal/bright        Signed Electronically by: Surjit Sanders PA-C    Answers submitted by the patient for this visit:  Patient Health Questionnaire (Submitted on 10/8/2024)  PHQ9 TOTAL SCORE: 0  Patient Health Questionnaire (G7) (Submitted on 10/8/2024)  JUAN 7 TOTAL SCORE: 0

## 2024-10-09 LAB
ALBUMIN SERPL BCG-MCNC: 4.8 G/DL (ref 3.5–5.2)
ALP SERPL-CCNC: 45 U/L (ref 40–150)
ALT SERPL W P-5'-P-CCNC: 22 U/L (ref 0–50)
ANION GAP SERPL CALCULATED.3IONS-SCNC: 11 MMOL/L (ref 7–15)
AST SERPL W P-5'-P-CCNC: 27 U/L (ref 0–45)
BILIRUB SERPL-MCNC: 0.5 MG/DL
BUN SERPL-MCNC: 17.2 MG/DL (ref 6–20)
CALCIUM SERPL-MCNC: 9.9 MG/DL (ref 8.8–10.4)
CHLORIDE SERPL-SCNC: 101 MMOL/L (ref 98–107)
CREAT SERPL-MCNC: 0.82 MG/DL (ref 0.51–0.95)
EGFRCR SERPLBLD CKD-EPI 2021: >90 ML/MIN/1.73M2
GLUCOSE SERPL-MCNC: 84 MG/DL (ref 70–99)
HCO3 SERPL-SCNC: 26 MMOL/L (ref 22–29)
POTASSIUM SERPL-SCNC: 4.7 MMOL/L (ref 3.4–5.3)
PROT SERPL-MCNC: 7.3 G/DL (ref 6.4–8.3)
SODIUM SERPL-SCNC: 138 MMOL/L (ref 135–145)

## 2024-10-09 NOTE — RESULT ENCOUNTER NOTE
"Rick Gupta  Your attached labs are within normal limits. Keep up the good work!  Please contact the office with any questions or concerns.    Brenda Jain \"Surjit\" NEISHA Sanders    "

## 2025-03-11 ENCOUNTER — MYC MEDICAL ADVICE (OUTPATIENT)
Dept: DERMATOLOGY | Facility: CLINIC | Age: 40
End: 2025-03-11
Payer: COMMERCIAL

## 2025-03-11 ENCOUNTER — MYC MEDICAL ADVICE (OUTPATIENT)
Dept: FAMILY MEDICINE | Facility: CLINIC | Age: 40
End: 2025-03-11
Payer: COMMERCIAL

## 2025-03-11 NOTE — TELEPHONE ENCOUNTER
Appears Mirena placed 08/17/2017 by Midwife team at [facility]  Unable to complete ultrasound guided removal/replacement at [facility]  Thanks,  Vandana ROJAS RN

## 2025-03-25 ENCOUNTER — HOSPITAL ENCOUNTER (OUTPATIENT)
Dept: MAMMOGRAPHY | Facility: CLINIC | Age: 40
Discharge: HOME | End: 2025-03-25
Attending: PHYSICIAN ASSISTANT | Admitting: PHYSICIAN ASSISTANT
Payer: COMMERCIAL

## 2025-03-25 DIAGNOSIS — Z12.31 VISIT FOR SCREENING MAMMOGRAM: ICD-10-CM

## 2025-03-25 PROCEDURE — 77067 SCR MAMMO BI INCL CAD: CPT

## 2025-03-25 PROCEDURE — 77063 BREAST TOMOSYNTHESIS BI: CPT

## 2025-04-15 ENCOUNTER — TELEPHONE (OUTPATIENT)
Dept: OBGYN | Facility: CLINIC | Age: 40
End: 2025-04-15
Payer: COMMERCIAL

## 2025-04-15 NOTE — TELEPHONE ENCOUNTER
[facility] Call Center    Phone Message    May a detailed message be left on voicemail: yes     Reason for Call: Other: Pt called requesting guidance on pain management for the IUD appt on 4/17. Please contact pt to advise.      Action Taken: Message routed to:  Other: [facility]    Travel Screening: Not Applicable     Date of Service:

## 2025-04-15 NOTE — TELEPHONE ENCOUNTER
Discussed Ibuprofen 800 mg to take 1 hour prior to appointment time for cramping or could speak to provider about something for anxiety in clinic. Pt told she would need to come into clinic 1 hr prior to appt time to sign consents and have a  for after the appt. Pt states she will check with her  to see if he can drive her first, then she will reach back out via phone or [employer] message.

## 2025-04-17 ENCOUNTER — OFFICE VISIT (OUTPATIENT)
Dept: OBGYN | Facility: CLINIC | Age: 40
End: 2025-04-17
Attending: STUDENT IN AN ORGANIZED HEALTH CARE EDUCATION/TRAINING PROGRAM
Payer: COMMERCIAL

## 2025-04-17 ENCOUNTER — ANCILLARY PROCEDURE (OUTPATIENT)
Dept: ULTRASOUND IMAGING | Facility: CLINIC | Age: 40
End: 2025-04-17
Attending: ADVANCED PRACTICE MIDWIFE
Payer: COMMERCIAL

## 2025-04-17 VITALS
DIASTOLIC BLOOD PRESSURE: 84 MMHG | SYSTOLIC BLOOD PRESSURE: 132 MMHG | WEIGHT: 153.9 LBS | HEART RATE: 60 BPM | BODY MASS INDEX: 25.64 KG/M2 | HEIGHT: 65 IN

## 2025-04-17 DIAGNOSIS — Z30.433 ENCOUNTER FOR IUD REMOVAL AND REINSERTION: ICD-10-CM

## 2025-04-17 DIAGNOSIS — Z30.433 ENCOUNTER FOR REMOVAL AND REINSERTION OF INTRAUTERINE CONTRACEPTIVE DEVICE (IUD): Primary | ICD-10-CM

## 2025-04-17 DIAGNOSIS — Z30.433 ENCOUNTER FOR REMOVAL AND REINSERTION OF INTRAUTERINE CONTRACEPTIVE DEVICE (IUD): ICD-10-CM

## 2025-04-17 PROCEDURE — 58301 REMOVE INTRAUTERINE DEVICE: CPT | Performed by: STUDENT IN AN ORGANIZED HEALTH CARE EDUCATION/TRAINING PROGRAM

## 2025-04-17 PROCEDURE — 58300 INSERT INTRAUTERINE DEVICE: CPT | Performed by: STUDENT IN AN ORGANIZED HEALTH CARE EDUCATION/TRAINING PROGRAM

## 2025-04-17 PROCEDURE — 58301 REMOVE INTRAUTERINE DEVICE: CPT

## 2025-04-17 PROCEDURE — 250N000011 HC RX IP 250 OP 636: Performed by: STUDENT IN AN ORGANIZED HEALTH CARE EDUCATION/TRAINING PROGRAM

## 2025-04-17 PROCEDURE — 76998 US GUIDE INTRAOP: CPT | Mod: 26 | Performed by: STUDENT IN AN ORGANIZED HEALTH CARE EDUCATION/TRAINING PROGRAM

## 2025-04-17 PROCEDURE — 58300 INSERT INTRAUTERINE DEVICE: CPT

## 2025-04-17 RX ADMIN — LEVONORGESTREL 1 EACH: 52 INTRAUTERINE DEVICE INTRAUTERINE at 11:50

## 2025-04-17 NOTE — LETTER
"2025       RE: Candy Rodriguez  [address]  [facility] [address]     Dear Colleague,    Thank you for referring your patient, Candy Rodriguez, to the [facility] [place] [facility]. Please see a copy of my visit note below.    [facility]  Follow-Up Visit    S: Candy Rodriguez is a 39 year old  here for US guided IUD removal and Mirena IUD insertion as strings were not visualized on exam in clinic.    O:  /84   Pulse 60   Ht 1.651 m (5' 5\")   Wt 69.8 kg (153 lb 14.4 oz)   BMI 25.61 kg/m    Gen: Well-appearing, NAD  HEENT: Normocephalic, atraumatic  CV:        Well perfused; no LE edema  Pulm:  Normal respiratory effort and rate  Abd: Soft, non-tender, non-distended    Pelvic:  Normal appearing external female genitalia. Normal hair distribution. Vagina is without lesions. Scant discharge. Cervix multiparous, no lesions, no cervical motion tenderness. IUD strings not visualized    Mirena IUD removal/re-insertion  Patient was informed about the risks, benefits and alternatives to above procedure. Written informed consent was obtained and scanned into the medical record.  Patient received and verbalized understanding of discharge instrcutions. Discussed pain control and patient decided on ibuprofen and paracervical block.    The patient was placed in the dorsal lithotomy position.  A speculum was inserted in the vagina, and the cervix visualized.  The cervix was swabbed with betadine. 20cc 1% lidocaine was injected at 4 and 8 o'clock for a paracervical block. IUD visualized within uterus on US. Remainder of procedure completed under US guidance. Boseman forceps were used to attempt removal of IUD without success. Alligator forceps then used and IUD grasped and removed in its entirety. The cervix was again swabbed with betadine. A single-toothed tenaculum was applied to the anterior lip of the cervix for stabilization. " Mirena IUD placed without difficulty, strings trimmed to 2 cm. Patient tolerated the procedure well. EBL minimal.    LOT # [id number]   EXP date: May 2027       A/P:  Candy Rodriguez is a 39 year old  here for Mirena IUD removal and re-insertion.  - IUD placed at uterine fundus without difficulty  - Due for replacement in 2033 (6y)    Leana Elaine MD  [facility], Ob/Gyn  2025 1:37 PM      Again, thank you for allowing me to participate in the care of your patient.      Sincerely,    Leana Elaine MD

## 2025-04-17 NOTE — PATIENT INSTRUCTIONS
Patient Education   Intrauterine Device (IUD) Insertion: Care Instructions  Overview     An intrauterine device (IUD) is a very effective method of birth control. It is a small, plastic, T-shaped device that uses copper or hormones to prevent pregnancy. The doctor places the IUD into your uterus. Plastic strings tied to the end of the IUD hang down through the cervix into the vagina. Your doctor may teach you how to check the placement of your IUD by feeling the strings.  You can have an IUD inserted at any time, as long as you aren't pregnant and you don't have a pelvic infection. Be sure to tell your doctor about any health problems you have or medicines you take. The IUD can also be placed right after you have a baby.  There are two types of IUDs. The copper IUD works for up to 12 years. The hormonal IUD works for 3 to 8 years, depending on which brand you have. Talk to your doctor about which IUD is right for you and how long you can use it. The hormonal IUD also usually reduces menstrual bleeding and cramping.  Follow-up care is a key part of your treatment and safety. Be sure to make and go to all appointments, and call your doctor if you are having problems. It's also a good idea to know your test results and keep a list of the medicines you take.  How can you care for yourself at home?  It's safe to use while breastfeeding.  You may experience some mild cramping and light bleeding (spotting) for 1 or 2 days. Use a hot water bottle or a heating pad set on low on your belly for pain.  Take an over-the-counter pain medicine, such as acetaminophen (Tylenol), ibuprofen (Advil, Motrin), and naproxen (Aleve) if needed. Read and follow all instructions on the label.  Do not take two or more pain medicines at the same time unless the doctor told you to. Many pain medicines have acetaminophen, which is Tylenol. Too much acetaminophen (Tylenol) can be harmful.  If you want to check the string of your IUD, insert a  "finger into your vagina and feel for the cervix, which is at the top of the vagina and feels harder than the rest of your vagina. You should be able to feel the thin, plastic string coming out of the opening of your cervix. If you cannot feel the string, use another form of birth control and make an appointment with your doctor to have the string checked.  If the IUD comes out, save it and call your doctor. Be sure to use another form of birth control while the IUD is out.  Use condoms to protect against sexually transmitted infections (STIs), such as gonorrhea and chlamydia. An IUD does not protect you from STIs.  When should you call for help?   Call 911 anytime you think you may need emergency care. For example, call if:    You passed out (lost consciousness).     You have sudden, severe pain in your belly or pelvis.   Call your doctor now or seek immediate medical care if:    You have new belly or pelvic pain.     You have severe vaginal bleeding. This means that you are soaking through your usual pads or tampons each hour for 2 or more hours.     You are dizzy or lightheaded, or you feel like you may faint.     You have a fever and pelvic pain or vaginal discharge.     You have pelvic pain that is getting worse.   Watch closely for changes in your health, and be sure to contact your doctor if:    You cannot feel the string, or the IUD comes out.     You think you may be pregnant.   Where can you learn more?  Go to https://www.[employer].net/patiented  Enter U681 in the search box to learn more about \"Intrauterine Device (IUD) Insertion: Care Instructions.\"  Current as of: April 30, 2024  Content Version: 14.4    4972-4234 [employer].   Care instructions adapted under license by your healthcare professional. If you have questions about a medical condition or this instruction, always ask your healthcare professional. [employer] disclaims any warranty or liability for your use of this " information.  Thank you for trusting us with your care!   Please be aware, if you are on Mychart, you may see your results prior to your providers review. If labs are abnormal, we will call or message you on Ac[employer]aintable with a follow up plan.    If you need to contact us for questions about:  Symptoms, Scheduling & Medical Questions; Non-urgent (2-3 day response) Acquaintable message, Urgent (needing response today) [phone] (if after 3:30pm next day response)   Prescriptions: Please call your Pharmacy   Billing: [facility] [phone] or [facility]:[phone]

## 2025-04-17 NOTE — PROGRESS NOTES
"[facility]  Follow-Up Visit    S: Candy Rodriguez is a 39 year old  here for US guided IUD removal and Mirena IUD insertion as strings were not visualized on exam in clinic.    O:  /84   Pulse 60   Ht 1.651 m (5' 5\")   Wt 69.8 kg (153 lb 14.4 oz)   BMI 25.61 kg/m    Gen: Well-appearing, NAD  HEENT: Normocephalic, atraumatic  CV:        Well perfused; no LE edema  Pulm:  Normal respiratory effort and rate  Abd: Soft, non-tender, non-distended    Pelvic:  Normal appearing external female genitalia. Normal hair distribution. Vagina is without lesions. Scant discharge. Cervix multiparous, no lesions, no cervical motion tenderness. IUD strings not visualized    Mirena IUD removal/re-insertion  Patient was informed about the risks, benefits and alternatives to above procedure. Written informed consent was obtained and scanned into the medical record.  Patient received and verbalized understanding of discharge instrcutions. Discussed pain control and patient decided on ibuprofen and paracervical block.    The patient was placed in the dorsal lithotomy position.  A speculum was inserted in the vagina, and the cervix visualized.  The cervix was swabbed with betadine. 20cc 1% lidocaine was injected at 4 and 8 o'clock for a paracervical block. IUD visualized within uterus on US. Remainder of procedure completed under US guidance. Boseman forceps were used to attempt removal of IUD without success. Alligator forceps then used and IUD grasped and removed in its entirety. The cervix was again swabbed with betadine. A single-toothed tenaculum was applied to the anterior lip of the cervix for stabilization. Mirena IUD placed without difficulty, strings trimmed to 2 cm. Patient tolerated the procedure well. EBL minimal.    LOT # [id number]   EXP date: May 2027       A/P:  Candy Rodriguez is a 39 year old  here for Mirena IUD removal and re-insertion.  - IUD placed at uterine fundus without difficulty  - Due for " replacement in April 2033 (6y)    Leana Elaine MD  [facility], Ob/Gyn  04/17/2025 1:37 PM

## 2025-05-08 ENCOUNTER — MYC MEDICAL ADVICE (OUTPATIENT)
Dept: FAMILY MEDICINE | Facility: CLINIC | Age: 40
End: 2025-05-08
Payer: COMMERCIAL

## 2025-05-08 ENCOUNTER — MYC REFILL (OUTPATIENT)
Dept: FAMILY MEDICINE | Facility: CLINIC | Age: 40
End: 2025-05-08
Payer: COMMERCIAL

## 2025-05-08 DIAGNOSIS — L70.0 ACNE VULGARIS: ICD-10-CM

## 2025-05-08 RX ORDER — SPIRONOLACTONE 100 MG/1
100 TABLET, FILM COATED ORAL DAILY
Qty: 90 TABLET | Refills: 0 | Status: SHIPPED | OUTPATIENT
Start: 2025-05-08

## 2025-07-19 ENCOUNTER — MYC MEDICAL ADVICE (OUTPATIENT)
Dept: FAMILY MEDICINE | Facility: CLINIC | Age: 40
End: 2025-07-19
Payer: COMMERCIAL

## 2025-07-19 DIAGNOSIS — L70.0 ACNE VULGARIS: ICD-10-CM

## 2025-07-21 RX ORDER — SPIRONOLACTONE 100 MG/1
100 TABLET, FILM COATED ORAL DAILY
Qty: 90 TABLET | Refills: 1 | Status: SHIPPED | OUTPATIENT
Start: 2025-07-21

## 2025-07-21 NOTE — TELEPHONE ENCOUNTER
MP,  Please see below [employer] message and advise.  Pended fill in case no lab/visit needed  Last OV 10/08/2024  Thanks,  Nilda CHAIDEZ RN

## (undated) RX ORDER — LIDOCAINE HYDROCHLORIDE 10 MG/ML
INJECTION, SOLUTION INFILTRATION; PERINEURAL
Status: DISPENSED
Start: 2025-04-17